# Patient Record
Sex: MALE | Race: WHITE | NOT HISPANIC OR LATINO | Employment: OTHER | URBAN - METROPOLITAN AREA
[De-identification: names, ages, dates, MRNs, and addresses within clinical notes are randomized per-mention and may not be internally consistent; named-entity substitution may affect disease eponyms.]

---

## 2017-02-03 ENCOUNTER — TELEPHONE (OUTPATIENT)
Dept: NEUROSURGERY | Facility: CLINIC | Age: 72
End: 2017-02-03

## 2017-02-03 DIAGNOSIS — M51.36 DDD (DEGENERATIVE DISC DISEASE), LUMBAR: Primary | ICD-10-CM

## 2017-02-03 NOTE — TELEPHONE ENCOUNTER
Provider:  Darrell   Caller:Emilie     Phone #:  [phone]  Surgery:  LUMBAR LAMINECTOMY POSTERIOR LUMBAR INTERBODY FUSION L4-S1  Surgery Date:  10/28/16  Last visit:   12/5/16    KOLBY:         Reason for call:       Wife left message stating pt needs an extension on the PT order, provided fax number of therapist office. Fax#[phone]

## 2017-10-05 ENCOUNTER — OFFICE VISIT (OUTPATIENT)
Dept: NEUROSURGERY | Facility: CLINIC | Age: 72
End: 2017-10-05

## 2017-10-05 VITALS
DIASTOLIC BLOOD PRESSURE: 76 MMHG | BODY MASS INDEX: 22.96 KG/M2 | HEIGHT: 71 IN | SYSTOLIC BLOOD PRESSURE: 120 MMHG | TEMPERATURE: 97.2 F | WEIGHT: 164 LBS

## 2017-10-05 DIAGNOSIS — G89.29 CHRONIC MIDLINE LOW BACK PAIN WITHOUT SCIATICA: Primary | ICD-10-CM

## 2017-10-05 DIAGNOSIS — M54.50 CHRONIC MIDLINE LOW BACK PAIN WITHOUT SCIATICA: Primary | ICD-10-CM

## 2017-10-05 PROCEDURE — 99213 OFFICE O/P EST LOW 20 MIN: CPT | Performed by: PHYSICIAN ASSISTANT

## 2017-10-05 RX ORDER — MELOXICAM 15 MG/1
15 TABLET ORAL DAILY
Qty: 90 TABLET | Refills: 3 | Status: SHIPPED | OUTPATIENT
Start: 2017-10-05 | End: 2022-05-24

## 2017-10-05 RX ORDER — RANITIDINE 150 MG/1
150 TABLET ORAL DAILY
COMMUNITY
End: 2022-05-24

## 2017-10-05 RX ORDER — MELOXICAM 15 MG/1
15 TABLET ORAL DAILY
Qty: 90 TABLET | Refills: 3 | Status: SHIPPED | OUTPATIENT
Start: 2017-10-05 | End: 2017-10-05

## 2017-10-05 NOTE — PROGRESS NOTES
Patient: Prashant Parra  : 1945    Primary Care Provider: Miguel Clarke MD      History    Chief Complaint: Chronic low back pain    History of Present Illness: Patient is well-known to the neurosurgical practice for undergoing a L4 to S1 posterior lateral interbody fusion on 2016.  Patient is very active male for 71 years old.  Patient has had ongoing back pain that is limited his activities.  Patient states that he does fine when sitting, but going from a seated to standing position has been causing back pain.  Patient denies any hip or leg pain that would be associated with this.  Patient states it is worse in the morning when he first wakes up and once he gets moving, it alleviates.  This is a very telling picture of arthritic back pain.  Patient has not been taking any anti-inflammatories.    135 minute discussion with patient and wife about expectations following his surgery.  This magnitude.  Patient does state that he is much better with his back pain than he was prior to surgery.  Patient actually states that he has no leg pain as compared to prior to surgery.  Patient initially presented with exquisite back pain and radiating sciatica pain and bilateral lower extremities when walking.    Patient is about to go to [place] again for the year.  Patient is leaving on  for the winter.  I'll like to try the patient on anti-inflammatory to hopefully help with this arthritic back pain.  If this does not help, we may pursue x-rays of the lumbar spine as well as of pain management injection.       Review of Systems    Constitutional: Negative for activity change, appetite change, chills, diaphoresis, fatigue, fever and unexpected weight change.   HENT: Positive for rhinorrhea. Negative for congestion, dental problem, drooling, ear discharge, ear pain, facial swelling, hearing loss, mouth sores, nosebleeds, postnasal drip, sinus pressure, sneezing, sore throat, tinnitus, trouble  swallowing and voice change.    Eyes: Negative for photophobia, pain, discharge, redness, itching and visual disturbance.   Respiratory: Negative for apnea, cough, choking, chest tightness, shortness of breath, wheezing and stridor.    Cardiovascular: Negative for chest pain, palpitations and leg swelling.   Gastrointestinal: Negative for abdominal distention, abdominal pain, anal bleeding, blood in stool, constipation, diarrhea, nausea, rectal pain and vomiting.   Endocrine: Negative for cold intolerance, heat intolerance, polydipsia, polyphagia and polyuria.   Genitourinary: Positive for urgency. Negative for decreased urine volume, difficulty urinating, dysuria, enuresis, flank pain, frequency, genital sores and hematuria.   Musculoskeletal: Positive for back pain and gait problem. Negative for arthralgias, joint swelling, myalgias, neck pain and neck stiffness.   Skin: Negative for color change, pallor, rash and wound.   Allergic/Immunologic: Negative for environmental allergies, food allergies and immunocompromised state.   Neurological: Negative for dizziness, tremors, seizures, syncope, facial asymmetry, speech difficulty, weakness, light-headedness, numbness and headaches.   Hematological: Negative for adenopathy. Bruises/bleeds easily.   Psychiatric/Behavioral: Negative for agitation, behavioral problems, confusion, decreased concentration, dysphoric mood, hallucinations, self-injury, sleep disturbance and suicidal ideas. The patient is nervous/anxious. The patient is not hyperactive.      Past Medical History:     Past Medical History:   Diagnosis Date   • Back pain    • Bleeding ulcer     1972   • BPH (benign prostatic hypertrophy)    • Cervical spondylosis without myelopathy    • Coronary artery disease    • Degeneration of intervertebral disc of cervical region    • Depression    • Diabetes mellitus     DIAGNOSED 1994; CHECKS [facility]SBG DAILY    • History of transfusion     1972 FROM ULCER   • Hyperlipidemia   "  • Hypertension    • Kidney stone    • MI (myocardial infarction)    • Shingles        Family History:     Family History   Problem Relation Age of Onset   • Heart disease Mother    • Heart disease Father    • Diabetes Father        Social History:    reports that he quit smoking about 28 years ago. He has never used smokeless tobacco. He reports that he does not drink alcohol or use illicit drugs.    Surgical History:     Past Surgical History:   Procedure Laterality Date   • CARDIAC CATHETERIZATION     • CORONARY ARTERY BYPASS GRAFT  2004    x 3 vessels   • CYSTOSCOPY W/ LASER LITHOTRIPSY     • INTERVENTIONAL RADIOLOGY PROCEDURE N/A 10/20/2016    Procedure: IR myelogram lumbar spine;  Surgeon: Chris Oakes MD;  Location:  [facility] CATH INVASIVE LOCATION;  Service:    • LUMBAR DISCECTOMY FUSION INSTRUMENTATION N/A 10/28/2016    Procedure: LUMBAR LAMINECTOMY POSTERIOR LUMBAR INTERBODY FUSION L4-S1;  Surgeon: Toni Ramírez MD;  Location:  [facility];  Service:        Allergies:   Review of patient's allergies indicates no known allergies.    Physical Exam:   /76  Temp 97.2 °F (36.2 °C)  Ht 71\" (180.3 cm)  Wt 164 lb (74.4 kg)  BMI 22.87 kg/m2  GENEREAL:   The patient is in no acute distress, and is able to answer all questions appropriately.  HEENT: Pupils are equal and reactive to light.  Visual fields are full.  Extraocular movements are intact without nystagmus.  There is no evidence of trauma.  Neck: Supple without lymphadenopathy  Cardiovascular: Regular rate and rhythm   Abdomen: Soft, non-tender, non-distended.    Musculoskeletal: The patient’s strength is intact in upper and lower extremities upon direct testing.   strength is 5 out of 5 bilaterally. Shoulder abduction is 5 out of 5.  Dorsiflexion and plantarflexion are equal bilaterally as well as the hip flexion against resistance.  The patient’s gait is normal without antalgia.  Neurologic: The patient is alert and oriented by 3.  Recent " memory, language, attention span, and fund of knowledge are all with within normal limits.  There is no evidence of central motor drift. No facial droop.  No difficulty with rapid alternating movements.  Sensation is equal bilaterally with no deficit.    Reflexes are 2+ at biceps, triceps, brachioradialis, as well as the patellar and Achilles tendon bilaterally.  CRANIAL NERVES:  Cranial nerve II: Review of the fundi demonstrates no edema.  Visual fields are full to confrontation.  Cranial nerves III, IV and VI: PERRLA DC.  Extraocular movements are intact.  Nystagmus is not present.  Cranial nerve V: Visual sensation is intact to light touch.  Cranial nerve VII: Muscles of facial expression revealed no asymmetry.  Cranial nerve VIII: Hearing is intact to finger rub bilaterally.  Cranial nerve IX and X: Palate elevates symmetrically.   Cranial nerve XI: Shoulder shrug is intact.  Cranial nerve XII: Tongue is midline without evidence of Atrophy or fasciculation.    There is some edema noted in the left lower extremity but is 1+.    Medical Decision Making    Data Review:   No films reviewed at this visit    Diagnosis:   Chronic low back pain  Arthritic back pain  Postlaminectomy syndrome    Treatment Options:   At this time we're going to treat this conservatively with medicines.  I have prescribed Mobic 15 mg take 1 by mouth daily.  Patient will take this over the winter and if patient is no better, they will call and I'll give further recommendations as far as pain management and x-rays.    It has been a pleasure providing neurosurgical care.    Freddy Becker PA-C      No diagnosis found.

## 2017-10-05 NOTE — PROGRESS NOTES
Subjective   Patient ID: Prashant Parra is a 71 y.o. male is here today for follow-up for ***.    HPI:      ***    The following portions of the patient's history were reviewed and updated as appropriate: allergies, current medications, past family history, past medical history, past social history, past surgical history and problem list.    Review of Systems   Constitutional: Negative for activity change, appetite change, chills, diaphoresis, fatigue, fever and unexpected weight change.   HENT: Positive for rhinorrhea. Negative for congestion, dental problem, drooling, ear discharge, ear pain, facial swelling, hearing loss, mouth sores, nosebleeds, postnasal drip, sinus pressure, sneezing, sore throat, tinnitus, trouble swallowing and voice change.    Eyes: Negative for photophobia, pain, discharge, redness, itching and visual disturbance.   Respiratory: Negative for apnea, cough, choking, chest tightness, shortness of breath, wheezing and stridor.    Cardiovascular: Negative for chest pain, palpitations and leg swelling.   Gastrointestinal: Negative for abdominal distention, abdominal pain, anal bleeding, blood in stool, constipation, diarrhea, nausea, rectal pain and vomiting.   Endocrine: Negative for cold intolerance, heat intolerance, polydipsia, polyphagia and polyuria.   Genitourinary: Positive for urgency. Negative for decreased urine volume, difficulty urinating, dysuria, enuresis, flank pain, frequency, genital sores and hematuria.   Musculoskeletal: Positive for back pain and gait problem. Negative for arthralgias, joint swelling, myalgias, neck pain and neck stiffness.   Skin: Negative for color change, pallor, rash and wound.   Allergic/Immunologic: Negative for environmental allergies, food allergies and immunocompromised state.   Neurological: Negative for dizziness, tremors, seizures, syncope, facial asymmetry, speech difficulty, weakness, light-headedness, numbness and headaches.   Hematological:  Negative for adenopathy. Bruises/bleeds easily.   Psychiatric/Behavioral: Negative for agitation, behavioral problems, confusion, decreased concentration, dysphoric mood, hallucinations, self-injury, sleep disturbance and suicidal ideas. The patient is nervous/anxious. The patient is not hyperactive.          Objective     Physical Exam   ***    Assessment/Plan   Independent Review of Radiographic Studies:    ***  Medical Decision Making:    ***  There are no diagnoses linked to this encounter.  No Follow-up on file.

## 2018-07-02 ENCOUNTER — LAB REQUISITION (OUTPATIENT)
Dept: LAB | Facility: HOSPITAL | Age: 73
End: 2018-07-02

## 2018-07-02 ENCOUNTER — OUTSIDE FACILITY SERVICE (OUTPATIENT)
Dept: GASTROENTEROLOGY | Facility: CLINIC | Age: 73
End: 2018-07-02

## 2018-07-02 DIAGNOSIS — R07.9 CHEST PAIN: ICD-10-CM

## 2018-07-02 DIAGNOSIS — R63.0 ANOREXIA: ICD-10-CM

## 2018-07-02 DIAGNOSIS — K29.00 ACUTE GASTRITIS WITHOUT BLEEDING: ICD-10-CM

## 2018-07-02 PROCEDURE — 88305 TISSUE EXAM BY PATHOLOGIST: CPT | Performed by: INTERNAL MEDICINE

## 2018-07-02 PROCEDURE — 43239 EGD BIOPSY SINGLE/MULTIPLE: CPT | Performed by: INTERNAL MEDICINE

## 2018-07-03 LAB
CYTO UR: NORMAL
LAB AP CASE REPORT: NORMAL
LAB AP CLINICAL INFORMATION: NORMAL
PATH REPORT.FINAL DX SPEC: NORMAL
PATH REPORT.GROSS SPEC: NORMAL

## 2018-07-12 ENCOUNTER — TELEPHONE (OUTPATIENT)
Dept: GASTROENTEROLOGY | Facility: CLINIC | Age: 73
End: 2018-07-12

## 2020-10-08 ENCOUNTER — OFFICE VISIT (OUTPATIENT)
Dept: ENDOCRINOLOGY | Facility: CLINIC | Age: 75
End: 2020-10-08

## 2020-10-08 VITALS
HEIGHT: 71 IN | BODY MASS INDEX: 23.46 KG/M2 | SYSTOLIC BLOOD PRESSURE: 134 MMHG | DIASTOLIC BLOOD PRESSURE: 71 MMHG | WEIGHT: 167.6 LBS

## 2020-10-08 DIAGNOSIS — IMO0002 DIABETES MELLITUS TYPE 2, UNCONTROLLED, WITH COMPLICATIONS: Primary | ICD-10-CM

## 2020-10-08 LAB
EXPIRATION DATE: NORMAL
HBA1C MFR BLD: 8.6 %
Lab: NORMAL

## 2020-10-08 PROCEDURE — 83036 HEMOGLOBIN GLYCOSYLATED A1C: CPT | Performed by: PHYSICIAN ASSISTANT

## 2020-10-08 PROCEDURE — 99213 OFFICE O/P EST LOW 20 MIN: CPT | Performed by: PHYSICIAN ASSISTANT

## 2020-10-08 RX ORDER — CARBIDOPA 25 MG/1
TABLET ORAL EVERY 6 HOURS SCHEDULED
COMMUNITY

## 2020-10-08 RX ORDER — ERGOCALCIFEROL (VITAMIN D2) 50 MCG
CAPSULE ORAL
COMMUNITY

## 2020-10-08 RX ORDER — BACILLUS COAGULANS/INULIN 1B-250 MG
CAPSULE ORAL
COMMUNITY
End: 2022-05-24

## 2020-10-08 RX ORDER — LISINOPRIL 10 MG/1
TABLET ORAL
COMMUNITY

## 2020-10-08 RX ORDER — CANAGLIFLOZIN 100 MG/1
TABLET, FILM COATED ORAL
COMMUNITY
Start: 2020-08-08 | End: 2021-06-08 | Stop reason: SDUPTHER

## 2020-10-08 RX ORDER — METOPROLOL TARTRATE 100 MG/1
TABLET ORAL
COMMUNITY
End: 2020-10-08 | Stop reason: SDUPTHER

## 2020-10-08 RX ORDER — MEMANTINE HYDROCHLORIDE AND DONEPEZIL HYDROCHLORIDE 28; 10 MG/1; MG/1
CAPSULE ORAL
COMMUNITY

## 2020-10-08 RX ORDER — ALPRAZOLAM 0.25 MG/1
0.25 TABLET ORAL NIGHTLY PRN
COMMUNITY
Start: 2020-08-11

## 2020-10-08 RX ORDER — NITROGLYCERIN 0.4 MG/1
TABLET SUBLINGUAL
COMMUNITY

## 2020-10-08 RX ORDER — ATORVASTATIN CALCIUM 40 MG/1
80 TABLET, FILM COATED ORAL DAILY
COMMUNITY

## 2020-10-08 RX ORDER — ASPIRIN 81 MG/1
TABLET ORAL
COMMUNITY
End: 2020-10-08 | Stop reason: SDUPTHER

## 2020-10-08 RX ORDER — DULOXETIN HYDROCHLORIDE 60 MG/1
CAPSULE, DELAYED RELEASE ORAL
COMMUNITY

## 2020-10-08 RX ORDER — CHLORAL HYDRATE 500 MG
1000 CAPSULE ORAL 2 TIMES DAILY WITH MEALS
COMMUNITY

## 2020-10-08 NOTE — PROGRESS NOTES
"     Office Note      Date: 10/08/2020  Patient Name: Prashant Parra  MRN: [id number]  : 1945    Chief Complaint   Patient presents with   • Diabetes       History of Present Illness:   Prashant Parra is a 74 y.o. male who presents for Type 2 diabetes.  He reports he is taking his medication regularly.  He denies any hypoglycemia.  He reports he has been eating pies after dinner most nights and knows he needs to cut this out. He reports he has been more active this summer and feels well today.  He reports he has noted some weakness in his legs and trouble with his balance since he was diagnosed with Parkinson's.    He reports he is going back to [place] for the winter later this month.  He has an appt to get his flu vaccine tomorrow.  He reports he had labs with his PCP in May-- will have these sent here for review.        Subjective     Review of Systems:   Review of Systems   Constitutional: Negative for activity change, appetite change, chills, diaphoresis, fatigue, fever and unexpected weight change.   HENT: Positive for congestion.    Cardiovascular: Negative for chest pain, palpitations and leg swelling.   Gastrointestinal: Negative for abdominal distention, abdominal pain, constipation, diarrhea, nausea and vomiting.   Endocrine: Negative for cold intolerance, heat intolerance, polydipsia, polyphagia and polyuria.       The following portions of the patient's history were reviewed and updated as appropriate: allergies, current medications, past family history, past medical history, past social history, past surgical history and problem list.    Objective     Vitals:    10/08/20 1449   BP: 134/71   BP Location: Left arm   Patient Position: Sitting   Cuff Size: Adult   Weight: 76 kg (167 lb 9.6 oz)   Height: 180.3 cm (71\")   PainSc: 0-No pain     Body mass index is 23.38 kg/m².    Physical Exam  Constitutional:       General: He is not in acute distress.     Appearance: Normal appearance. "   Cardiovascular:      Pulses:           Dorsalis pedis pulses are 1+ on the right side and 1+ on the left side.        Posterior tibial pulses are 1+ on the right side and 1+ on the left side.   Musculoskeletal:      Right foot: No deformity.      Left foot: No deformity.   Feet:      Right foot:      Protective Sensation: 5 sites tested. 4 sites sensed.      Skin integrity: Skin integrity normal.      Toenail Condition: Right toenails are abnormally thick.      Left foot:      Protective Sensation: 5 sites tested.      Skin integrity: Skin integrity normal.      Toenail Condition: Left toenails are abnormally thick.      Comments: Great toenails thickened bilaterally  Diabetic Foot Exam Performed and Monofilament Test Performed  Neurological:      Mental Status: He is alert.             Assessment / Plan      Assessment & Plan:  1. Diabetes mellitus type 2, uncontrolled, with complications (CMS/[facility])  A1c today 8.6%-- slightly improved, but above goal-- discussed insulin and DPP4 inhibitor.  Pt prefers to cut back on his sweets and will check A1c in about 3-4 mos and do telehealth visit for monitoring.  If A1c has not improved will add new medication.  Pt to call prn.    Pt will have copy of recent labs sent here for review.    - POC Glycosylated Hemoglobin (Hb A1C)  - Hemoglobin A1c; Future      Return in about 4 months (around 2/8/2021), or telehealth, for Recheck, Video visit.     Jessica Lucio PA-C  10/08/2020

## 2021-02-18 ENCOUNTER — TELEPHONE (OUTPATIENT)
Dept: ENDOCRINOLOGY | Facility: CLINIC | Age: 76
End: 2021-02-18

## 2021-03-02 ENCOUNTER — TELEPHONE (OUTPATIENT)
Dept: ENDOCRINOLOGY | Facility: CLINIC | Age: 76
End: 2021-03-02

## 2021-03-18 ENCOUNTER — TELEPHONE (OUTPATIENT)
Dept: ENDOCRINOLOGY | Facility: CLINIC | Age: 76
End: 2021-03-18

## 2021-03-18 NOTE — TELEPHONE ENCOUNTER
Called and spoke with patients wife.  States that his blood sugar has been running high.  Readings have been   3/3/   3/4/21 -207   3/5/   3/6/   3/7/   3/8/   3/9/   3/10/   3/11/   3/12/21 at 1230pm-309   3/13/21 at 900am 281   3/18/21 at 900am-200.  Patient is currently taking metformin, glimepiride and Invokana.

## 2021-03-18 NOTE — TELEPHONE ENCOUNTER
Called and spoke with pt and his wife-- they prefer to hold off on insulin until he can come into the office.  We will add Januvia 100mg daily for now and see if this helps.  If his BGs stay in the 200-300s we will need to consider insulin.  They voiced understanding and I sent a prescription.  RT

## 2021-03-18 NOTE — TELEPHONE ENCOUNTER
Patient's wife called concerned for her . Says for several days now his blood sugar has been in the high 200s-300s. They're in [place] until the middle of May and they aren't sure what they should do. They asked for a call back either before 11 today or after three. Please advise.

## 2021-03-22 NOTE — TELEPHONE ENCOUNTER
PT CALLED AND WANTED TO KNOW IF SHE COULD SWITCH THE RX JANUVIA FROM [facility]. PT STATED IT WAS TO EXPENSIVE  AND WANTED US TO SEND IT TO [facility]. IF YOU COULD GIVE PT A CALL THEY ARE IN [place]. THANKS

## 2021-06-08 ENCOUNTER — OFFICE VISIT (OUTPATIENT)
Dept: ENDOCRINOLOGY | Facility: CLINIC | Age: 76
End: 2021-06-08

## 2021-06-08 VITALS
HEART RATE: 74 BPM | SYSTOLIC BLOOD PRESSURE: 128 MMHG | HEIGHT: 71 IN | OXYGEN SATURATION: 93 % | WEIGHT: 172 LBS | BODY MASS INDEX: 24.08 KG/M2 | DIASTOLIC BLOOD PRESSURE: 72 MMHG

## 2021-06-08 DIAGNOSIS — E11.42 TYPE 2 DIABETES MELLITUS WITH DIABETIC POLYNEUROPATHY, WITHOUT LONG-TERM CURRENT USE OF INSULIN (HCC): ICD-10-CM

## 2021-06-08 DIAGNOSIS — E11.65 TYPE 2 DIABETES MELLITUS WITH HYPERGLYCEMIA, WITHOUT LONG-TERM CURRENT USE OF INSULIN (HCC): Primary | ICD-10-CM

## 2021-06-08 PROBLEM — E78.2 MIXED HYPERLIPIDEMIA: Status: ACTIVE | Noted: 2021-06-08

## 2021-06-08 PROBLEM — I10 ESSENTIAL HYPERTENSION: Status: ACTIVE | Noted: 2021-06-08

## 2021-06-08 PROCEDURE — 99213 OFFICE O/P EST LOW 20 MIN: CPT | Performed by: PHYSICIAN ASSISTANT

## 2021-06-08 RX ORDER — GLIMEPIRIDE 4 MG/1
4 TABLET ORAL 2 TIMES DAILY
Qty: 180 TABLET | Refills: 2 | Status: SHIPPED | OUTPATIENT
Start: 2021-06-08 | End: 2022-03-14

## 2021-06-08 RX ORDER — CANAGLIFLOZIN 100 MG/1
100 TABLET, FILM COATED ORAL DAILY
Qty: 90 TABLET | Refills: 2 | Status: SHIPPED | OUTPATIENT
Start: 2021-06-08 | End: 2022-03-07

## 2021-06-08 NOTE — PROGRESS NOTES
"     Office Note      Date: 2021  Patient Name: Prashant Parra  MRN: [id number]  : 1945    Chief Complaint   Patient presents with   • Diabetes       History of Present Illness:   Prashant Parra is a 75 y.o. male who presents for 2 diabetes follow-up.  He is accompanied by his wife today.  In March he was having trouble with elevated blood sugar so we added Januvia 100 mg daily.  He reports he has tolerated this medication with no trouble and has also worked on limiting his sweet intake.  In addition to the Januvia he remains on glimepiride 4 mg 2 tablets daily, Invokana 100 mg daily and Metformin at 1000 mg twice a day.  He had labs done last month with his primary care physician and his A1c had improved to 7.7%.  He is up-to-date on his annual eye exam.  He has had both doses of his Covid vaccine.  He denies any trouble with his feet today and just recently saw his podiatrist.  We did a foot exam in our office back in October.  He recently had fasting labs with his primary care physician.      Subjective     Review of Systems:   Review of Systems   Constitutional: Negative.    Cardiovascular: Negative.    Gastrointestinal: Negative.    Endocrine: Negative.    Neurological: Negative.        The following portions of the patient's history were reviewed and updated as appropriate: allergies, current medications, past family history, past medical history, past social history, past surgical history and problem list.    Objective     Vitals:    21 1015   BP: 128/72   BP Location: Right arm   Patient Position: Sitting   Cuff Size: Adult   Pulse: 74   SpO2: 93%   Weight: 78 kg (172 lb)   Height: 180.3 cm (71\")   PainSc: 0-No pain     Body mass index is 23.99 kg/m².    Physical Exam  Vitals reviewed.   Constitutional:       General: He is not in acute distress.     Appearance: Normal appearance.   Neurological:      Mental Status: He is alert.         HEMOGLOBIN A1C  Lab Results   Component Value " Date    HGBA1C 8.6 10/08/2020         Assessment / Plan      Assessment & Plan:  1. Type 2 diabetes mellitus with hyperglycemia, without long-term current use of insulin (CMS/[facility])  His A1c last month had improved at 7.7%.  This is acceptable for him.  For now we will continue glimepiride 4 mg 2 tablets daily, Invokana 100 mg daily, Metformin at 1000 mg twice a day and Januvia 100 mg daily.  I encouraged continued healthy eating habits and physical activity as tolerated.  I refilled his diabetes medications today.  His blood pressure was okay today.  His weight is up 4 pounds since his appointment in October.  Reviewed his fasting lab results from his primary care physician his metabolic panel was normal his cholesterol numbers were to goal except for low HDL cholesterol and mildly elevated triglycerides.  Thyroid tests were normal.    2. Type 2 diabetes mellitus with diabetic polyneuropathy, without long-term current use of insulin (CMS/HCC)  His A1c has improved at 7.7% and is acceptable.  We did his foot exam at his appointment in October he sees the podiatrist regularly.      Return in about 4 months (around 10/8/2021) for Recheck.     Jessica Lucio PA-C  06/08/2021

## 2021-12-02 ENCOUNTER — TELEPHONE (OUTPATIENT)
Dept: ENDOCRINOLOGY | Facility: CLINIC | Age: 76
End: 2021-12-02

## 2022-01-31 ENCOUNTER — TELEPHONE (OUTPATIENT)
Dept: ENDOCRINOLOGY | Facility: CLINIC | Age: 77
End: 2022-01-31

## 2022-02-17 NOTE — TELEPHONE ENCOUNTER
Due to change in Dr Aguilar schedule on 2/18. Patient time was changed to 130pm with arrival of 12pm  Patient was called and is aware   Spoke with patient and reviewed lab with him letter in chart.  He is going to call back to schedule.

## 2022-03-07 ENCOUNTER — TELEPHONE (OUTPATIENT)
Dept: ENDOCRINOLOGY | Facility: CLINIC | Age: 77
End: 2022-03-07

## 2022-03-07 RX ORDER — DAPAGLIFLOZIN 5 MG/1
5 TABLET, FILM COATED ORAL DAILY
Qty: 90 TABLET | Refills: 0 | Status: SHIPPED | OUTPATIENT
Start: 2022-03-07 | End: 2022-05-24 | Stop reason: SDUPTHER

## 2022-03-07 NOTE — TELEPHONE ENCOUNTER
PT'S WIFE CALLED STATING INVOKANA IS NOT COVERED BY INSURANCE. SHE STATED JARDIANCE AND FARXIGA ARE COVERED. SHE REQUESTED WE SEND IN AN RX IN TO [facility].

## 2022-03-07 NOTE — TELEPHONE ENCOUNTER
Please let them know I sent a prescription for Farxiga 5 mg daily to replace invokana.  He must keep his appt schedule in May for further refills.  Thanks RT

## 2022-03-14 RX ORDER — GLIMEPIRIDE 4 MG/1
TABLET ORAL
Qty: 180 TABLET | Refills: 0 | Status: SHIPPED | OUTPATIENT
Start: 2022-03-14 | End: 2022-05-24 | Stop reason: SDUPTHER

## 2022-04-26 RX ORDER — SITAGLIPTIN 100 MG/1
TABLET, FILM COATED ORAL
Qty: 90 TABLET | Refills: 0 | Status: SHIPPED | OUTPATIENT
Start: 2022-04-26 | End: 2022-05-24

## 2022-05-24 ENCOUNTER — OFFICE VISIT (OUTPATIENT)
Dept: ENDOCRINOLOGY | Facility: CLINIC | Age: 77
End: 2022-05-24

## 2022-05-24 ENCOUNTER — TELEPHONE (OUTPATIENT)
Dept: ENDOCRINOLOGY | Facility: CLINIC | Age: 77
End: 2022-05-24

## 2022-05-24 VITALS
HEIGHT: 70 IN | SYSTOLIC BLOOD PRESSURE: 136 MMHG | HEART RATE: 83 BPM | BODY MASS INDEX: 24.34 KG/M2 | OXYGEN SATURATION: 96 % | DIASTOLIC BLOOD PRESSURE: 78 MMHG | WEIGHT: 170 LBS

## 2022-05-24 DIAGNOSIS — E11.65 TYPE 2 DIABETES MELLITUS WITH HYPERGLYCEMIA, WITHOUT LONG-TERM CURRENT USE OF INSULIN: Primary | ICD-10-CM

## 2022-05-24 LAB
EXPIRATION DATE: ABNORMAL
EXPIRATION DATE: NORMAL
GLUCOSE BLDC GLUCOMTR-MCNC: 131 MG/DL (ref 70–130)
HBA1C MFR BLD: 8.2 %
Lab: ABNORMAL
Lab: NORMAL

## 2022-05-24 PROCEDURE — 83036 HEMOGLOBIN GLYCOSYLATED A1C: CPT | Performed by: PHYSICIAN ASSISTANT

## 2022-05-24 PROCEDURE — 82947 ASSAY GLUCOSE BLOOD QUANT: CPT | Performed by: PHYSICIAN ASSISTANT

## 2022-05-24 PROCEDURE — 99213 OFFICE O/P EST LOW 20 MIN: CPT | Performed by: PHYSICIAN ASSISTANT

## 2022-05-24 PROCEDURE — 3052F HG A1C>EQUAL 8.0%<EQUAL 9.0%: CPT | Performed by: PHYSICIAN ASSISTANT

## 2022-05-24 RX ORDER — DAPAGLIFLOZIN 5 MG/1
5 TABLET, FILM COATED ORAL DAILY
Qty: 90 TABLET | Refills: 1 | Status: SHIPPED | OUTPATIENT
Start: 2022-05-24 | End: 2022-08-30 | Stop reason: SDUPTHER

## 2022-05-24 RX ORDER — SEMAGLUTIDE 1.34 MG/ML
0.5 INJECTION, SOLUTION SUBCUTANEOUS WEEKLY
Qty: 4.5 ML | Refills: 1 | Status: SHIPPED | OUTPATIENT
Start: 2022-05-24 | End: 2022-05-26 | Stop reason: SDUPTHER

## 2022-05-24 RX ORDER — GLIMEPIRIDE 4 MG/1
4 TABLET ORAL 2 TIMES DAILY
Qty: 180 TABLET | Refills: 1 | Status: SHIPPED | OUTPATIENT
Start: 2022-05-24 | End: 2022-08-30 | Stop reason: SDUPTHER

## 2022-05-24 NOTE — PROGRESS NOTES
"     Office Note      Date: 2022  Patient Name: Prashant Parra  MRN: [id number]  : 1945    Chief Complaint   Patient presents with   • Diabetes       History of Present Illness:   Prashant Parra is a 76 y.o. male who presents for follow-up for type 2 diabetes.  It is been almost a year since his last appointment.  They have been in [place] for the winter and stay longer than expected.  He remains on Januvia 100 mg daily, glimepiride 4 mg 2 tablets daily, Farxiga 5 mg daily and metformin 1000 mg twice a day.  He reports he eats some sweets but overall tries to do a good job with his diet.  He reports he is due for his eye exam and has an appointment in October.  He reports his primary care physician just check fasting labs on him earlier this month.  His wife forgot to bring the copy today.  He denies any trouble with his feet today he sees the podiatrist regularly.      Subjective     Review of Systems:   Review of Systems   Constitutional: Negative.    Cardiovascular: Negative.    Gastrointestinal: Negative.    Endocrine: Negative.    Neurological: Negative.        The following portions of the patient's history were reviewed and updated as appropriate: allergies, current medications, past family history, past medical history, past social history, past surgical history and problem list.    Objective     Vitals:    22 1038   BP: 136/78   Pulse: 83   SpO2: 96%   Weight: 77.1 kg (170 lb)   Height: 177.8 cm (70\")   PainSc: 0-No pain     Body mass index is 24.39 kg/m².    Physical Exam  Vitals reviewed.   Constitutional:       General: He is not in acute distress.     Appearance: Normal appearance.   Neurological:      Mental Status: He is alert.         HEMOGLOBIN A1C  Lab Results   Component Value Date    HGBA1C 8.2 2022       GLUCOSE  Glucose   Date Value Ref Range Status   2022 131 (A) 70 - 130 mg/dL Final           Assessment / Plan      Assessment & Plan:  1. Type 2 diabetes " mellitus with hyperglycemia, without long-term current use of insulin (HCC)  His A1c is up some today at 8.2%.  He reports he is willing to consider a once a week injectable to try to get his blood glucose readings under better control.  We will stop the Januvia and add Ozempic 0.5 mg weekly.  He was instructed to start with a 0.25 mg weekly dose for the first 4 weeks and then increase to the 0.5 mg dose.  I reviewed how to use the pen device and sent a prescription for this to his pharmacy.  We discussed the possible side effects as well as the cardiovascular benefits.  For now he will continue Farxiga 5 mg daily, metformin 1000 mg twice a day and glimepiride 4 mg 2 tablets daily.  We discussed monitoring his blood glucose readings and if he has symptoms of hypoglycemia we may need to consider reducing the glimepiride.  He will watch for this.  I encouraged him to contact me if the medication is too expensive or he has trouble tolerating this.  His blood pressure is okay today.  His weight is down 2 pounds since his appointment in June.  I encouraged healthy eating habits and physical activity as tolerated.  We will try to get a copy of his lab results from his primary care physician.  - POC Glycosylated Hemoglobin (Hb A1C)  - POC Glucose, Blood      Return in about 3 months (around 8/24/2022) for Recheck.     This note was dictated using Dragon voice recognition.    Jessica Lucio PA-C  05/24/2022

## 2022-05-24 NOTE — PATIENT INSTRUCTIONS
Stop the Januvia  Start Ozempic 0.25mg once a week for 4 weeks then go to the 0.5mg dose weekly until your appointment

## 2022-05-26 ENCOUNTER — TELEPHONE (OUTPATIENT)
Dept: ENDOCRINOLOGY | Facility: CLINIC | Age: 77
End: 2022-05-26

## 2022-05-26 RX ORDER — DULAGLUTIDE 0.75 MG/.5ML
0.75 INJECTION, SOLUTION SUBCUTANEOUS WEEKLY
Qty: 6 ML | Refills: 1 | Status: SHIPPED | OUTPATIENT
Start: 2022-05-26 | End: 2022-08-30 | Stop reason: SDUPTHER

## 2022-05-26 NOTE — TELEPHONE ENCOUNTER
Let's switch him to Trulicity 0.75mg weekly and keep him on this dose.  Please let the patient know the Ozempic is not covered and we are switching to Trulicity once a week.  Do I need to send in a prescription or do we have to call it in with the reference number?  Let me know, thanks RT

## 2022-05-26 NOTE — TELEPHONE ENCOUNTER
"ref: [id number]    Elsa from [facility] calling on behalf of patient regarding semaglutide.    She is calling because the patient's plan does not cover the ozempic. [facility] is calling to see if we would be willing to switch to one of the \"preferred covered alternatives\" such as:  1. byetta pre filled pen in 5 or 10 mcg mfg by [employer]  2. Bydureon bcise auto injector in 2mg mfg by [employer]  3. trulicity sd pen in 0.75mg and 1.5mg and 3mg and 4.5mg mfg by [facility]      "

## 2022-06-03 ENCOUNTER — TELEPHONE (OUTPATIENT)
Dept: ENDOCRINOLOGY | Facility: CLINIC | Age: 77
End: 2022-06-03

## 2022-06-09 DIAGNOSIS — Z12.11 ENCOUNTER FOR SCREENING COLONOSCOPY: Primary | ICD-10-CM

## 2022-06-16 ENCOUNTER — TELEPHONE (OUTPATIENT)
Dept: GASTROENTEROLOGY | Facility: CLINIC | Age: 77
End: 2022-06-16

## 2022-06-20 ENCOUNTER — TELEPHONE (OUTPATIENT)
Dept: ENDOCRINOLOGY | Facility: CLINIC | Age: 77
End: 2022-06-20

## 2022-06-20 NOTE — TELEPHONE ENCOUNTER
Spoke to pts wife - does he take farxiga along with trulicity?  Yes - along with metformin and glimepiride.  She voiced understanding

## 2022-06-24 ENCOUNTER — TELEPHONE (OUTPATIENT)
Dept: GASTROENTEROLOGY | Facility: CLINIC | Age: 77
End: 2022-06-24

## 2022-06-24 NOTE — TELEPHONE ENCOUNTER
Deana,  I  Called Mrs Parra back. Patients' wife is wondering to know if an EGD can be added on the same day as Colonoscopy for 6/30/2022 with Dr Montemayor. Patient has history of ulcers. Can you please give wife a call back.

## 2022-06-30 ENCOUNTER — OUTSIDE FACILITY SERVICE (OUTPATIENT)
Dept: GASTROENTEROLOGY | Facility: CLINIC | Age: 77
End: 2022-06-30

## 2022-06-30 PROCEDURE — G0121 COLON CA SCRN NOT HI RSK IND: HCPCS | Performed by: INTERNAL MEDICINE

## 2022-06-30 PROCEDURE — 88305 TISSUE EXAM BY PATHOLOGIST: CPT | Performed by: INTERNAL MEDICINE

## 2022-06-30 PROCEDURE — 43239 EGD BIOPSY SINGLE/MULTIPLE: CPT | Performed by: INTERNAL MEDICINE

## 2022-06-30 RX ORDER — PANTOPRAZOLE SODIUM 40 MG/1
TABLET, DELAYED RELEASE ORAL
Qty: 90 TABLET | Refills: 3 | Status: SHIPPED | OUTPATIENT
Start: 2022-06-30

## 2022-07-01 ENCOUNTER — LAB REQUISITION (OUTPATIENT)
Dept: LAB | Facility: HOSPITAL | Age: 77
End: 2022-07-01

## 2022-07-01 ENCOUNTER — TELEPHONE (OUTPATIENT)
Dept: GASTROENTEROLOGY | Facility: CLINIC | Age: 77
End: 2022-07-01

## 2022-07-01 DIAGNOSIS — R13.10 DYSPHAGIA, UNSPECIFIED: ICD-10-CM

## 2022-07-01 DIAGNOSIS — Z12.11 ENCOUNTER FOR SCREENING FOR MALIGNANT NEOPLASM OF COLON: ICD-10-CM

## 2022-07-01 DIAGNOSIS — K31.89 OTHER DISEASES OF STOMACH AND DUODENUM: ICD-10-CM

## 2022-07-01 DIAGNOSIS — Q39.9 CONGENITAL MALFORMATION OF ESOPHAGUS, UNSPECIFIED: ICD-10-CM

## 2022-07-01 DIAGNOSIS — K44.9 DIAPHRAGMATIC HERNIA WITHOUT OBSTRUCTION OR GANGRENE: ICD-10-CM

## 2022-07-01 DIAGNOSIS — K20.90 ESOPHAGITIS, UNSPECIFIED WITHOUT BLEEDING: ICD-10-CM

## 2022-07-01 DIAGNOSIS — K26.9 DUODENAL ULCER, UNSPECIFIED AS ACUTE OR CHRONIC, WITHOUT HEMORRHAGE OR PERFORATION: ICD-10-CM

## 2022-07-01 DIAGNOSIS — R12 HEARTBURN: ICD-10-CM

## 2022-07-01 NOTE — TELEPHONE ENCOUNTER
I called Mrs Parra back. Informed her that pantoprazole has been sent to [facility] mail order.

## 2022-08-30 ENCOUNTER — OFFICE VISIT (OUTPATIENT)
Dept: ENDOCRINOLOGY | Facility: CLINIC | Age: 77
End: 2022-08-30

## 2022-08-30 VITALS
HEART RATE: 76 BPM | WEIGHT: 163 LBS | SYSTOLIC BLOOD PRESSURE: 126 MMHG | HEIGHT: 70 IN | BODY MASS INDEX: 23.34 KG/M2 | DIASTOLIC BLOOD PRESSURE: 71 MMHG | OXYGEN SATURATION: 96 %

## 2022-08-30 DIAGNOSIS — I10 ESSENTIAL HYPERTENSION: ICD-10-CM

## 2022-08-30 DIAGNOSIS — E11.65 TYPE 2 DIABETES MELLITUS WITH HYPERGLYCEMIA, WITHOUT LONG-TERM CURRENT USE OF INSULIN: Primary | ICD-10-CM

## 2022-08-30 LAB
EXPIRATION DATE: ABNORMAL
EXPIRATION DATE: NORMAL
GLUCOSE BLDC GLUCOMTR-MCNC: 172 MG/DL (ref 70–130)
HBA1C MFR BLD: 7.4 %
Lab: ABNORMAL
Lab: NORMAL

## 2022-08-30 PROCEDURE — 3051F HG A1C>EQUAL 7.0%<8.0%: CPT | Performed by: PHYSICIAN ASSISTANT

## 2022-08-30 PROCEDURE — 83036 HEMOGLOBIN GLYCOSYLATED A1C: CPT | Performed by: PHYSICIAN ASSISTANT

## 2022-08-30 PROCEDURE — 82947 ASSAY GLUCOSE BLOOD QUANT: CPT | Performed by: PHYSICIAN ASSISTANT

## 2022-08-30 PROCEDURE — 99214 OFFICE O/P EST MOD 30 MIN: CPT | Performed by: PHYSICIAN ASSISTANT

## 2022-08-30 RX ORDER — DAPAGLIFLOZIN 5 MG/1
5 TABLET, FILM COATED ORAL DAILY
Qty: 90 TABLET | Refills: 2 | Status: SHIPPED | OUTPATIENT
Start: 2022-08-30

## 2022-08-30 RX ORDER — DULAGLUTIDE 0.75 MG/.5ML
0.75 INJECTION, SOLUTION SUBCUTANEOUS WEEKLY
Qty: 6 ML | Refills: 2 | Status: SHIPPED | OUTPATIENT
Start: 2022-08-30

## 2022-08-30 RX ORDER — GLIMEPIRIDE 4 MG/1
4 TABLET ORAL 2 TIMES DAILY
Qty: 180 TABLET | Refills: 2 | Status: SHIPPED | OUTPATIENT
Start: 2022-08-30 | End: 2023-03-30 | Stop reason: SDUPTHER

## 2022-08-30 NOTE — PROGRESS NOTES
"     Office Note      Date: 2022  Patient Name: Prashant Parra  MRN: [id number]  : 1945    Chief Complaint   Patient presents with   • Diabetes       History of Present Illness:   Prashant Parra is a 76 y.o. male who presents for follow-up for type 2 diabetes.  He is accompanied by his wife today.  At last appointment we added Ozempic to his medications but Trulicity was preferred.  He is taking Trulicity 0.75 mg weekly and reports he has tolerated this with no issues.  He remains on glimepiride 4 mg 2 tablets daily, Farxiga 5 mg daily and metformin 1000 mg twice a day.  He is checking his blood sugar and these are typically around 150 mg/dL.  He has had a few readings above 200 but not on a regular basis.  Denies any severe hypoglycemia.  He is up-to-date on his eye exam he goes annually in October.  He had labs completed with his primary care physician in May.  He sees the podiatrist regularly.  He denies any trouble with his feet today.      Subjective     Review of Systems:   Review of Systems   Constitutional: Negative.    Cardiovascular: Negative.    Gastrointestinal: Negative.    Endocrine: Negative.    Neurological: Negative.        The following portions of the patient's history were reviewed and updated as appropriate: allergies, current medications, past family history, past medical history, past social history, past surgical history and problem list.    Objective     Vitals:    22 1017   BP: 126/71   Pulse: 76   SpO2: 96%   Weight: 73.9 kg (163 lb)   Height: 177.8 cm (70\")     Body mass index is 23.39 kg/m².    Physical Exam  Vitals reviewed.   Constitutional:       General: He is not in acute distress.     Appearance: Normal appearance.   Neurological:      Mental Status: He is alert.         HEMOGLOBIN A1C  Lab Results   Component Value Date    HGBA1C 7.4 2022       GLUCOSE  Glucose   Date Value Ref Range Status   2022 172 (A) 70 - 130 mg/dL Final         Assessment / " Plan      Assessment & Plan:  1. Type 2 diabetes mellitus with hyperglycemia, without long-term current use of insulin (HCC)  His hemoglobin A1c has improved at 7.4%.  We will continue Trulicity 0.75 mg weekly, glimepiride 4 mg 2 tablets daily, Farxiga 5 mg daily and metformin 1000 mg twice a day.  I encouraged continued healthy eating habits and physical activity as tolerated.  I refilled his medications today.  We discussed increasing Trulicity to 1.5 mg weekly if he starts to see more blood glucose readings in the 200s.  He leaves for [place] in December and will be back in May.  He reports he will contact me if his blood glucose readings become elevated.  - POC Glucose, Blood  - POC Glycosylated Hemoglobin (Hb A1C)    2. Essential hypertension  His blood pressure is okay today.  He will continue his current medications.      Return in about 3 months (around 11/30/2022) for Recheck 3-4 mos.     This note was dictated using Dragon voice recognition.    Jessica Lucio PA-C  08/30/2022

## 2023-03-27 ENCOUNTER — TELEPHONE (OUTPATIENT)
Dept: ENDOCRINOLOGY | Facility: CLINIC | Age: 78
End: 2023-03-27
Payer: MEDICARE

## 2023-03-27 NOTE — TELEPHONE ENCOUNTER
Was he discharged on insulin?  Can we get copies of labs and d/c summary from the hospital in [place] so I can review these.  I would suspect his blood glucose readings will improve back on the Trulicity, but they need to give it more time.  If not we may need to add long acting insulin if he is not on anything.  I would like to review the notes first.  He also needs to make an appointment for when they plan to come back from [place].  Thanks RT

## 2023-03-27 NOTE — TELEPHONE ENCOUNTER
Okay, et's try to get the records from the hospital so I can review labs before we make adjustments.  Thanks RT

## 2023-03-27 NOTE — TELEPHONE ENCOUNTER
Blood sugar numbers:  3/20 1115am 281 has eaten b'fast earlier  3/21 0930 207 pre-b'fast  3/22 0800 187 pre-b'fast  3/23 0900 195 pre-b'fast, 4pm 256  3/25 0945 229 pre-b'fast, 530pm 201  3/26 0930 240 pre-b'fast  3/27 0915 221 pre-b'fast.  He was admitted to hosp. In [place] on 2/12/23 with a stroke, they stooped all oral meds and Trulicity and put him on insulin.  He was sent on with a rx. For Metfromin 1000mg BID, Jardiance 10mg QD(not taking d/t he is taking Farxiga 5mg QD. They did not start him back on Trulicty but he restarted it himself this past Friday was his 1st dose since the stroke.  Wife concered blood sugars are so elevated all the time. He is not taking the glimepiride since d/c from hosp. Either. Please advise. CORNELIO

## 2023-03-27 NOTE — TELEPHONE ENCOUNTER
Patient's wife called because she is concerned about her 's blood sugar numbers.  While they were in [facility] he had a stroke and they changed a bunch of his medications, which she has been monitoring since they came back.  Now it seems that his blood sugar has been running high at least over 200 and she has been trying all she knows but she cant get them back down.  She is asking for a call to be advised about what to do for him, whether bringing him in or changing some dosages of his medications.  Please advise.

## 2023-03-27 NOTE — TELEPHONE ENCOUNTER
FILIBERTO wife and scheduled an appt. For Thursday. She will call if he is unable to make it. CORNELIO

## 2023-03-30 ENCOUNTER — OFFICE VISIT (OUTPATIENT)
Dept: ENDOCRINOLOGY | Facility: CLINIC | Age: 78
End: 2023-03-30
Payer: MEDICARE

## 2023-03-30 VITALS
HEIGHT: 70 IN | HEART RATE: 52 BPM | WEIGHT: 150.4 LBS | SYSTOLIC BLOOD PRESSURE: 134 MMHG | OXYGEN SATURATION: 97 % | DIASTOLIC BLOOD PRESSURE: 78 MMHG | BODY MASS INDEX: 21.53 KG/M2

## 2023-03-30 DIAGNOSIS — I10 ESSENTIAL HYPERTENSION: ICD-10-CM

## 2023-03-30 DIAGNOSIS — Z86.73 HISTORY OF CVA (CEREBROVASCULAR ACCIDENT): ICD-10-CM

## 2023-03-30 DIAGNOSIS — E11.65 TYPE 2 DIABETES MELLITUS WITH HYPERGLYCEMIA, WITHOUT LONG-TERM CURRENT USE OF INSULIN: Primary | ICD-10-CM

## 2023-03-30 LAB
EXPIRATION DATE: ABNORMAL
EXPIRATION DATE: NORMAL
GLUCOSE BLDC GLUCOMTR-MCNC: 363 MG/DL (ref 70–130)
HBA1C MFR BLD: 8.7 %
Lab: ABNORMAL
Lab: NORMAL

## 2023-03-30 PROCEDURE — 3052F HG A1C>EQUAL 8.0%<EQUAL 9.0%: CPT | Performed by: PHYSICIAN ASSISTANT

## 2023-03-30 PROCEDURE — 83036 HEMOGLOBIN GLYCOSYLATED A1C: CPT | Performed by: PHYSICIAN ASSISTANT

## 2023-03-30 PROCEDURE — 36415 COLL VENOUS BLD VENIPUNCTURE: CPT | Performed by: PHYSICIAN ASSISTANT

## 2023-03-30 PROCEDURE — 1159F MED LIST DOCD IN RCRD: CPT | Performed by: PHYSICIAN ASSISTANT

## 2023-03-30 PROCEDURE — 3075F SYST BP GE 130 - 139MM HG: CPT | Performed by: PHYSICIAN ASSISTANT

## 2023-03-30 PROCEDURE — 82947 ASSAY GLUCOSE BLOOD QUANT: CPT | Performed by: PHYSICIAN ASSISTANT

## 2023-03-30 PROCEDURE — 99214 OFFICE O/P EST MOD 30 MIN: CPT | Performed by: PHYSICIAN ASSISTANT

## 2023-03-30 PROCEDURE — 1160F RVW MEDS BY RX/DR IN RCRD: CPT | Performed by: PHYSICIAN ASSISTANT

## 2023-03-30 PROCEDURE — 3078F DIAST BP <80 MM HG: CPT | Performed by: PHYSICIAN ASSISTANT

## 2023-03-30 PROCEDURE — 80053 COMPREHEN METABOLIC PANEL: CPT | Performed by: PHYSICIAN ASSISTANT

## 2023-03-30 RX ORDER — GLIMEPIRIDE 4 MG/1
4 TABLET ORAL
Qty: 30 TABLET | Refills: 0 | Status: SHIPPED | OUTPATIENT
Start: 2023-03-30

## 2023-03-30 NOTE — PATIENT INSTRUCTIONS
Continue Trulicity 0.75mg weekly, farxiga 5mg daily (until finished then switch to Jardiance daily), Metformin 1000mg 2x daily  Add Glimepiride 4mg every morning-- if glucose still >200 after 1 week increase to two tablets daily

## 2023-03-30 NOTE — PROGRESS NOTES
Office Note      Date: 2023  Patient Name: Prashant Parra  MRN: [id number]  : 1945    Chief Complaint   Patient presents with   • Diabetes       History of Present Illness:   Prashant Parra is a 77 y.o. male who presents for follow-up for type 2 diabetes.  He had a stroke in February and was hospitalized in [place] from  through .  He was then sent to rehab and checked out  and came back to [place] .  Patient's wife reports he was in the emergency department and hospitalized in December with a TIA from  through .  She reports they were in [place] and getting ready to go to bed and Mr. Parra was unable to speak so she called 911 and they transported him to the local hospital.  He continues to note weakness and is in a wheelchair today but they are having home physical therapy twice a week and is speaking is much better.  Patient's wife reports his blood sugars have been high typically in the 200s to 300s.  She reports he has been taking metformin 1000 mg twice a day, Farxiga 5 mg daily he has 6 weeks left of this and then will be switching to the Jardiance 10 mg which is covered by insurance and Trulicity 0.75 mg weekly.  They just resumed the Trulicity last Friday.  They have not been taking the glimepiride as he has been out of this medication.  Patient's wife reports he was down to 144 pounds but has gained some weight since they have been back to [place].  She reports his appetite has been much better.  She reports he was given an insulin sliding scale in the hospital they did not give him any of his home medications.  Following the stroke he was having a lot of issues with nausea and vomiting and vertigo but this seems to have resolved.  He continues to note significant fatigue and his wife reports he wants to sleep a lot.      Subjective     Review of Systems:   Review of Systems   Constitutional: Positive for fatigue.  "  Cardiovascular: Negative.    Gastrointestinal: Negative.    Endocrine: Negative.    Musculoskeletal: Positive for gait problem.   Neurological: Positive for dizziness and weakness.       The following portions of the patient's history were reviewed and updated as appropriate: allergies, current medications, past family history, past medical history, past social history, past surgical history and problem list.    Objective     Vitals:    03/30/23 1257   BP: 134/78   Pulse: 52   SpO2: 97%   Weight: 68.2 kg (150 lb 6.4 oz)   Height: 177.8 cm (70\")     Body mass index is 21.58 kg/m².    Physical Exam  Vitals reviewed.   Constitutional:       General: He is not in acute distress.     Appearance: Normal appearance.      Comments: In a wheelchair today.   Neurological:      Mental Status: He is alert.         HEMOGLOBIN A1C  Lab Results   Component Value Date    HGBA1C 8.7 03/30/2023       GLUCOSE  Glucose   Date Value Ref Range Status   03/30/2023 363 (A) 70 - 130 mg/dL Final           Assessment / Plan      Assessment & Plan:  1. Type 2 diabetes mellitus with hyperglycemia, without long-term current use of insulin ([facility])  His hemoglobin A1c is up as compared to his appointment in August at 8.7%.  His glucose this afternoon is 363 mg/dL.  I think it is safe to add back glimepiride 4 mg 1 tablet daily.  We discussed that we want to take this slow due to the risk of hypoglycemia.  He will take 1 tablet regularly for 1 week and if his blood glucose readings remain above 250 mg/dL he will increase the dose to 2 tablets daily.  He will continue Trulicity 0.75 mg weekly, Farxiga 5 mg daily and metformin 1000 mg twice a day.  I am checking a CMP today and will reach out to them with the results.    I reviewed recent labs from his hospitalization on March 2 and his GFR was 50.7  His weight is down 13 pounds since his appointment in August.  - POC Glucose, Blood  - POC Glycosylated Hemoglobin (Hb A1C)  - Comprehensive Metabolic " Panel; Future  - Comprehensive Metabolic Panel    2. Essential hypertension  His blood pressure is okay today.  He will continue his current medications.  They are planning to see the cardiologist coming up.    3. History of CVA (cerebrovascular accident)      Return in about 3 months (around 6/30/2023).     This note was dictated using Dragon voice recognition.    Jessica Lucio PA-C  03/30/2023

## 2023-03-31 ENCOUNTER — TELEPHONE (OUTPATIENT)
Dept: ENDOCRINOLOGY | Facility: CLINIC | Age: 78
End: 2023-03-31
Payer: MEDICARE

## 2023-03-31 LAB
ALBUMIN SERPL-MCNC: 3.6 G/DL (ref 3.5–5.2)
ALBUMIN/GLOB SERPL: 1.2 G/DL
ALP SERPL-CCNC: 64 U/L (ref 39–117)
ALT SERPL W P-5'-P-CCNC: <5 U/L (ref 1–41)
ANION GAP SERPL CALCULATED.3IONS-SCNC: 10.1 MMOL/L (ref 5–15)
AST SERPL-CCNC: 11 U/L (ref 1–40)
BILIRUB SERPL-MCNC: 0.3 MG/DL (ref 0–1.2)
BUN SERPL-MCNC: 26 MG/DL (ref 8–23)
BUN/CREAT SERPL: 17.1 (ref 7–25)
CALCIUM SPEC-SCNC: 10 MG/DL (ref 8.6–10.5)
CHLORIDE SERPL-SCNC: 100 MMOL/L (ref 98–107)
CO2 SERPL-SCNC: 26.9 MMOL/L (ref 22–29)
CREAT SERPL-MCNC: 1.52 MG/DL (ref 0.76–1.27)
EGFRCR SERPLBLD CKD-EPI 2021: 46.3 ML/MIN/1.73
GLOBULIN UR ELPH-MCNC: 3 GM/DL
GLUCOSE SERPL-MCNC: 328 MG/DL (ref 65–99)
POTASSIUM SERPL-SCNC: 4.9 MMOL/L (ref 3.5–5.2)
PROT SERPL-MCNC: 6.6 G/DL (ref 6–8.5)
SODIUM SERPL-SCNC: 137 MMOL/L (ref 136–145)

## 2023-03-31 NOTE — TELEPHONE ENCOUNTER
Please call and let them know his liver labs and electrolytes are normal and his kidney function is overall pretty stable.  We will continue with our plan we discussed yesterday.  Start Glimepiride 4 mg one tablet daily and if glucose readings are remaining above 250 mg/dl after 1 week they will add a second tablet.  Thanks RT

## 2023-07-28 ENCOUNTER — HOSPITAL ENCOUNTER (EMERGENCY)
Facility: HOSPITAL | Age: 78
Discharge: HOME | End: 2023-07-28
Attending: EMERGENCY MEDICINE
Payer: MEDICARE

## 2023-07-28 ENCOUNTER — APPOINTMENT (OUTPATIENT)
Dept: GENERAL RADIOLOGY | Facility: HOSPITAL | Age: 78
End: 2023-07-28
Payer: MEDICARE

## 2023-07-28 VITALS
BODY MASS INDEX: 21.47 KG/M2 | SYSTOLIC BLOOD PRESSURE: 146 MMHG | HEIGHT: 70 IN | TEMPERATURE: 98 F | OXYGEN SATURATION: 97 % | HEART RATE: 82 BPM | WEIGHT: 150 LBS | DIASTOLIC BLOOD PRESSURE: 87 MMHG | RESPIRATION RATE: 18 BRPM

## 2023-07-28 DIAGNOSIS — I73.9 PAD (PERIPHERAL ARTERY DISEASE): ICD-10-CM

## 2023-07-28 DIAGNOSIS — M25.561 CHRONIC PAIN OF RIGHT KNEE: Primary | ICD-10-CM

## 2023-07-28 DIAGNOSIS — G89.29 CHRONIC PAIN OF RIGHT KNEE: Primary | ICD-10-CM

## 2023-07-28 DIAGNOSIS — M25.551 CHRONIC RIGHT HIP PAIN: ICD-10-CM

## 2023-07-28 DIAGNOSIS — G89.29 CHRONIC RIGHT HIP PAIN: ICD-10-CM

## 2023-07-28 DIAGNOSIS — M48.062 SPINAL STENOSIS, LUMBAR REGION, WITH NEUROGENIC CLAUDICATION: ICD-10-CM

## 2023-07-28 LAB
ALBUMIN SERPL-MCNC: 4.4 G/DL (ref 3.5–5.2)
ALBUMIN/GLOB SERPL: 1.5 G/DL
ALP SERPL-CCNC: 93 U/L (ref 39–117)
ALT SERPL W P-5'-P-CCNC: 7 U/L (ref 1–41)
ANION GAP SERPL CALCULATED.3IONS-SCNC: 13 MMOL/L (ref 5–15)
APTT PPP: 27.3 SECONDS (ref 60–90)
AST SERPL-CCNC: 14 U/L (ref 1–40)
BASOPHILS # BLD AUTO: 0.05 10*3/MM3 (ref 0–0.2)
BASOPHILS NFR BLD AUTO: 0.8 % (ref 0–1.5)
BILIRUB SERPL-MCNC: 0.3 MG/DL (ref 0–1.2)
BUN SERPL-MCNC: 32 MG/DL (ref 8–23)
BUN/CREAT SERPL: 22.4 (ref 7–25)
CALCIUM SPEC-SCNC: 9.2 MG/DL (ref 8.6–10.5)
CHLORIDE SERPL-SCNC: 98 MMOL/L (ref 98–107)
CO2 SERPL-SCNC: 26 MMOL/L (ref 22–29)
CREAT SERPL-MCNC: 1.43 MG/DL (ref 0.76–1.27)
DEPRECATED RDW RBC AUTO: 49.3 FL (ref 37–54)
EGFRCR SERPLBLD CKD-EPI 2021: 49.8 ML/MIN/1.73
EOSINOPHIL # BLD AUTO: 0.06 10*3/MM3 (ref 0–0.4)
EOSINOPHIL NFR BLD AUTO: 1 % (ref 0.3–6.2)
ERYTHROCYTE [DISTWIDTH] IN BLOOD BY AUTOMATED COUNT: 14.7 % (ref 12.3–15.4)
GLOBULIN UR ELPH-MCNC: 2.9 GM/DL
GLUCOSE SERPL-MCNC: 271 MG/DL (ref 65–99)
HCT VFR BLD AUTO: 43.1 % (ref 37.5–51)
HGB BLD-MCNC: 13.4 G/DL (ref 13–17.7)
IMM GRANULOCYTES # BLD AUTO: 0.03 10*3/MM3 (ref 0–0.05)
IMM GRANULOCYTES NFR BLD AUTO: 0.5 % (ref 0–0.5)
INR PPP: 1.04 (ref 0.89–1.12)
LYMPHOCYTES # BLD AUTO: 0.81 10*3/MM3 (ref 0.7–3.1)
LYMPHOCYTES NFR BLD AUTO: 13.1 % (ref 19.6–45.3)
MCH RBC QN AUTO: 28.4 PG (ref 26.6–33)
MCHC RBC AUTO-ENTMCNC: 31.1 G/DL (ref 31.5–35.7)
MCV RBC AUTO: 91.3 FL (ref 79–97)
MONOCYTES # BLD AUTO: 0.52 10*3/MM3 (ref 0.1–0.9)
MONOCYTES NFR BLD AUTO: 8.4 % (ref 5–12)
NEUTROPHILS NFR BLD AUTO: 4.71 10*3/MM3 (ref 1.7–7)
NEUTROPHILS NFR BLD AUTO: 76.2 % (ref 42.7–76)
NRBC BLD AUTO-RTO: 0 /100 WBC (ref 0–0.2)
PLATELET # BLD AUTO: 246 10*3/MM3 (ref 140–450)
PMV BLD AUTO: 10.4 FL (ref 6–12)
POTASSIUM SERPL-SCNC: 4.4 MMOL/L (ref 3.5–5.2)
PROT SERPL-MCNC: 7.3 G/DL (ref 6–8.5)
PROTHROMBIN TIME: 13.7 SECONDS (ref 12.2–14.5)
QT INTERVAL: 382 MS
QTC INTERVAL: 469 MS
RBC # BLD AUTO: 4.72 10*6/MM3 (ref 4.14–5.8)
SODIUM SERPL-SCNC: 137 MMOL/L (ref 136–145)
WBC NRBC COR # BLD: 6.18 10*3/MM3 (ref 3.4–10.8)

## 2023-07-28 PROCEDURE — 73562 X-RAY EXAM OF KNEE 3: CPT

## 2023-07-28 PROCEDURE — 80053 COMPREHEN METABOLIC PANEL: CPT | Performed by: EMERGENCY MEDICINE

## 2023-07-28 PROCEDURE — 85730 THROMBOPLASTIN TIME PARTIAL: CPT | Performed by: EMERGENCY MEDICINE

## 2023-07-28 PROCEDURE — 85025 COMPLETE CBC W/AUTO DIFF WBC: CPT | Performed by: EMERGENCY MEDICINE

## 2023-07-28 PROCEDURE — 93005 ELECTROCARDIOGRAM TRACING: CPT | Performed by: EMERGENCY MEDICINE

## 2023-07-28 PROCEDURE — 85610 PROTHROMBIN TIME: CPT | Performed by: EMERGENCY MEDICINE

## 2023-07-28 PROCEDURE — 99284 EMERGENCY DEPT VISIT MOD MDM: CPT

## 2023-07-28 RX ORDER — NALOXONE HYDROCHLORIDE 4 MG/.1ML
SPRAY NASAL
Qty: 2 EACH | Refills: 0 | Status: SHIPPED | OUTPATIENT
Start: 2023-07-28

## 2023-07-28 RX ORDER — TRAMADOL HYDROCHLORIDE 50 MG/1
50 TABLET ORAL EVERY 6 HOURS PRN
Qty: 15 TABLET | Refills: 0 | Status: SHIPPED | OUTPATIENT
Start: 2023-07-28

## 2023-07-28 RX ORDER — SODIUM CHLORIDE 0.9 % (FLUSH) 0.9 %
10 SYRINGE (ML) INJECTION AS NEEDED
Status: DISCONTINUED | OUTPATIENT
Start: 2023-07-28 | End: 2023-07-28 | Stop reason: HOSPADM

## 2023-07-28 NOTE — DISCHARGE INSTRUCTIONS
Rest.  Use the brace that was provided to you by your orthopedist.  Continue your current medications.  Ultram for more severe pain.  Call your orthopedist for follow-up.  Call Dr. Sae Andrade (vascular surgery) for follow-up in office.  Return to the emergency department if you have acute worsening pain, discoloration of the limb, numbness or other acute concerns.

## 2023-07-28 NOTE — ED PROVIDER NOTES
"Subjective   History of Present Illness  Mr. Parra is a 77 yr old male who was referred to our ED by his PCP for evaluation of right knee and hip pain in the setting of possible arterial vascular occlusive disease.  The pt states he has had ongoing right knee and right hip pain for at least a month.  He states at times, the knee gives out and results in a fall.  He states that he had outpatient ankle-brachial index studies and arterial Doppler of his extremities a few days ago and was contacted by his PCP and advised to come to our ER for concerns about possible occlusive arterial disease.  The patient states he was also seen by an orthopedist earlier this week and had a steroid injection in his right knee that did not offer any relief.  He states that he was told to return to the orthopedist for \"gel injections\" if this did not help.  The patient denies any injury to the leg.  Has had no associated chest pain or shortness of breath.  Past medical history includes previous CABG x3, IDDM type II, polyneuropathy, cardiac valve replacement and previous CVA in February 2023.    Review of Systems   Constitutional:  Negative for chills and fever.   HENT:  Negative for sore throat.    Respiratory:  Negative for cough and shortness of breath.    Cardiovascular:  Negative for chest pain.   Gastrointestinal:  Negative for abdominal pain, nausea and vomiting.   Genitourinary:  Negative for dysuria.   Musculoskeletal:  Positive for arthralgias (right knee and right hip pain).   Skin:  Negative for rash and wound.   Neurological:  Negative for numbness.   Hematological:  Does not bruise/bleed easily.   Psychiatric/Behavioral: Negative.       Past Medical History:   Diagnosis Date    Back pain     Bleeding ulcer     1972    BPH (benign prostatic hypertrophy)     Cervical spondylosis without myelopathy     Coronary artery disease     Degeneration of intervertebral disc of cervical region     Depression     Diabetes mellitus     " DIAGNOSED ; CHECKS FSBG DAILY     History of transfusion      FROM [facility]    Hyperlipidemia     Hypertension     Kidney stone     MI (myocardial infarction)     Shingles     Type 2 diabetes mellitus        Allergies   Allergen Reactions    Chlorthalidone GI Intolerance     Severe hyponatremia       Past Surgical History:   Procedure Laterality Date    CARDIAC CATHETERIZATION      CARDIAC VALVE REPLACEMENT      CORONARY ARTERY BYPASS GRAFT  2004    x 3 vessels    CYSTOSCOPY W/ LASER LITHOTRIPSY      INTERVENTIONAL RADIOLOGY PROCEDURE N/A 10/20/2016    Procedure: IR myelogram lumbar spine;  Surgeon: Chris Oakes MD;  Location: [facility] CATH INVASIVE LOC[facility];  Service:     LUMBAR DISCECTOMY FUSION INSTRUMENTATION N/A 10/28/2016    Procedure: LUMBAR LAMINECTOMY POSTERIOR LUMBAR INTERBODY FUSION L4-S1;  Surgeon: Toni Ramírez MD;  Location:  [facility];  Service:        Family History   Problem Relation Age of Onset    Heart disease Mother     Heart disease Father     Diabetes Father        Social History     Socioeconomic History    Marital status:    Tobacco Use    Smoking status: Former     Types: Cigarettes     Quit date:      Years since quittin.5    Smokeless tobacco: Never   Vaping Use    Vaping Use: Never used   Substance and Sexual Activity    Alcohol use: No    Drug use: No    Sexual activity: Defer           Objective   Physical Exam  Constitutional:       General: He is not in acute distress.     Appearance: Normal appearance. He is not ill-appearing.   HENT:      Head: Normocephalic.      Nose: Nose normal.   Eyes:      Pupils: Pupils are equal, round, and reactive to light.   Cardiovascular:      Rate and Rhythm: Normal rate and regular rhythm.      Pulses: Normal pulses.      Heart sounds: Normal heart sounds.      Comments: Palpable DP pulses in the right foot.  DP and PT pulses easily dopplered at bedside.  Both legs are warm and pink with no evidence of impaired perfusion at  "this time.  Pulmonary:      Effort: Pulmonary effort is normal.      Breath sounds: Normal breath sounds.   Abdominal:      Tenderness: There is no abdominal tenderness.   Musculoskeletal:      Cervical back: Normal range of motion and neck supple.      Comments: Normal range of motion.  No swelling or redness.  Mild tenderness on palpation over the posterior aspect of the right knee.   Skin:     General: Skin is warm and dry.   Neurological:      General: No focal deficit present.      Mental Status: He is alert and oriented to person, place, and time.   Psychiatric:         Mood and Affect: Mood normal.       Procedures           ED Course      In summary, 77-year-old male with history of IDDM, previous CABG, cardiac valve replacement, polyneuropathy and prior CVA, presents with right knee and right hip pain for the past month.  The patient states that at times, his leg \"gives out\" while walking.  He saw his PCP recently who ordered an outpatient arterial Doppler study with ankle-brachial indexes.  He was advised to come to our emergency department for further evaluation due to concerns for possible occlusive arterial disease in the leg.  The patient has also seen orthopedics recently and had an injection of steroids in the right knee with no improvement.    MDM: Differential includes claudication pain, arthritic pain, neuropathic pain, lumbar radicular pain, etc.    The spouse brought a copy of the outpatient arterial Doppler study for me to review.  It is not in epic.  Per the document that she provides, there is mild depression of segmental pressures at the calf and ankle of the right lower extremity.  The right OSBALDO is 0.82.  Right TBI 0.67.  Mild depression of the amplitude of the waveforms in the right lower extremity.  Left lower extremity shows pressures within normal limits at the thigh and calf.  Left OSBALDO 0.96.  Left TBI 0.59.  Overall impression: Mild large vessel arterial occlusive disease in the right " lower extremity apparently in the runoff beyond the common femoral artery.  No significant arterial occlusive disease in the runoff of the left lower extremity.  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  (This was dictated by Dr. Chandan Wilder on 7/26/2023.    The patient has palpable and dopplerable pedal pulses in both lower extremities.  His legs are warm and pink with no tenderness.  Given his description of the pain and the location of the pain, I favor an arthritic process.  I certainly do not think he has an acute arterial occlusive event at the present time.  He is currently on aspirin.  Plain films of the right knee were obtained and shows mild arthritic changes but no evidence of tumor, fracture or other acute process.    I discussed the case with Dr. Adams who agreed this sounds more of an arthritic process and does not warrant emergent vascular intervention or consult.  The patient be discharged to use the brace that was provided to him by his orthopedist last week and to follow-up with his orthopedist.  I will also refer him to vascular surgery for follow-up.                                         Medical Decision Making  Problems Addressed:  Chronic pain of right knee: complicated acute illness or injury  Chronic right hip pain: complicated acute illness or injury  PAD (peripheral artery disease): complicated acute illness or injury    Amount and/or Complexity of Data Reviewed  Labs: ordered.  Radiology: ordered.  ECG/medicine tests: ordered.    Risk  Prescription drug management.        Final diagnoses:   Chronic pain of right knee   Chronic right hip pain   PAD (peripheral artery disease)       ED Disposition  ED Disposition       ED Disposition   Discharge    Condition   Stable    Comment   --               Miguel Clarke MD  [address] [facility] [address] [phone]      call for follow up    Sae Andrade MD  [address]  [facility] [address]  [phone]      call for follow up appointment      Call your orthopedist on Monday for follow-up.        [facility] EMERGENCY DEPARTMENT  [address] [facility] [id number] [phone]    If symptoms worsen         Medication List      No changes were made to your prescriptions during this visit.            Sim Becker, PA  07/28/23 1338       Sim Becker PA  07/28/23 9059

## 2023-09-06 ENCOUNTER — TRANSCRIBE ORDERS (OUTPATIENT)
Dept: ADMINISTRATIVE | Facility: HOSPITAL | Age: 78
End: 2023-09-06
Payer: MEDICARE

## 2023-09-06 DIAGNOSIS — I70.213 ATHEROSCLEROSIS OF NATIVE ARTERY OF BOTH LOWER EXTREMITIES WITH INTERMITTENT CLAUDICATION: ICD-10-CM

## 2023-09-06 DIAGNOSIS — I73.9 PAD (PERIPHERAL ARTERY DISEASE): Primary | ICD-10-CM

## 2023-09-06 DIAGNOSIS — I70.201 POPLITEAL ARTERY STENOSIS, RIGHT: ICD-10-CM

## 2023-09-22 ENCOUNTER — HOSPITAL ENCOUNTER (OUTPATIENT)
Dept: MRI IMAGING | Facility: HOSPITAL | Age: 78
Discharge: HOME | End: 2023-09-22
Admitting: SURGERY
Payer: MEDICARE

## 2023-09-22 DIAGNOSIS — I70.213 ATHEROSCLEROSIS OF NATIVE ARTERY OF BOTH LOWER EXTREMITIES WITH INTERMITTENT CLAUDICATION: ICD-10-CM

## 2023-09-22 DIAGNOSIS — I70.201 POPLITEAL ARTERY STENOSIS, RIGHT: ICD-10-CM

## 2023-09-22 DIAGNOSIS — I73.9 PAD (PERIPHERAL ARTERY DISEASE): ICD-10-CM

## 2023-09-22 PROCEDURE — 72148 MRI LUMBAR SPINE W/O DYE: CPT

## 2023-09-25 ENCOUNTER — TELEPHONE (OUTPATIENT)
Dept: NEUROSURGERY | Facility: CLINIC | Age: 78
End: 2023-09-25
Payer: MEDICARE

## 2023-09-25 NOTE — TELEPHONE ENCOUNTER
Provider:  Darrell  Surgery/Procedure:  LUMBAR LAMINECTOMY POSTERIOR LUMBAR INTERBODY FUSION L4-S1   Surgery/Procedure Date: 10-  Last visit:   10-5-2017  Next visit: TBD     Reason for call:  ED with Johnathan Taylor MD (07/28/2023)    MRI Lumbar Spine Without Contrast (09/22/2023 12:15)     Patients wife called and wanted to know if someone could brandon them with the results of the MRI?  She also stated that they will need to make an appointment to see Freddy or Dr. Ramírez.  Please Advise. Thank you.

## 2023-09-28 ENCOUNTER — OFFICE VISIT (OUTPATIENT)
Dept: NEUROSURGERY | Facility: CLINIC | Age: 78
End: 2023-09-28
Payer: MEDICARE

## 2023-09-28 ENCOUNTER — HOSPITAL ENCOUNTER (OUTPATIENT)
Dept: GENERAL RADIOLOGY | Facility: HOSPITAL | Age: 78
Discharge: HOME | End: 2023-09-28
Payer: MEDICARE

## 2023-09-28 VITALS
BODY MASS INDEX: 20.99 KG/M2 | HEIGHT: 70 IN | TEMPERATURE: 97.7 F | WEIGHT: 146.6 LBS | DIASTOLIC BLOOD PRESSURE: 82 MMHG | SYSTOLIC BLOOD PRESSURE: 126 MMHG

## 2023-09-28 DIAGNOSIS — Z98.1 HISTORY OF LUMBAR FUSION: ICD-10-CM

## 2023-09-28 DIAGNOSIS — Z98.1 HISTORY OF LUMBAR FUSION: Primary | ICD-10-CM

## 2023-09-28 PROCEDURE — 73521 X-RAY EXAM HIPS BI 2 VIEWS: CPT

## 2023-09-28 PROCEDURE — 72110 X-RAY EXAM L-2 SPINE 4/>VWS: CPT

## 2023-09-28 NOTE — PROGRESS NOTES
NAME: CALLUM LOTT   DOS: 2023  : 1945  PCP: Miguel Clarke MD    Chief Complaint:    Chief Complaint   Patient presents with    Low back & right leg pain       History of Present Illness:  77 y.o. male   I saw a 77-year-old gentleman in neurosurgical consultation he presents with a complex history of diabetes she has reportedly had a stroke I did a prior two-level lumbar fusion that assisted a lot with his neurogenic claudicatory symptoms and back pain.    The reason for the presentation is he feels like his right knee is giving out he denies any paralytic symptoms he has difficulty raising his right knee up he has difficulty walking    Additionally he was has had reportedly a stroke done in [place] and has had a TAVR he has been seen by neurology where he had weakness in his right quadricep and has had vascular procedures on the leg he feels like his right leg is giving out    He is here for evaluation he has been worked up for Parkinson's in the past he has had multiple falls in the last week or so    PMHX  Allergies:  Allergies   Allergen Reactions    Chlorthalidone GI Intolerance     Severe hyponatremia    Donepezil Unknown - Low Severity    Mirtazapine Unknown - Low Severity     Medications    Current Outpatient Medications:     ALPRAZolam (XANAX) 0.25 MG tablet, Take 1 tablet by mouth At Night As Needed., Disp: , Rfl:     amLODIPine (NORVASC) 5 MG tablet, Take 1 tablet by mouth Daily., Disp: , Rfl:     aspirin 81 MG EC tablet, Take 1 tablet by mouth. Takes 1 tablet every other day, Disp: , Rfl:     atorvastatin (LIPITOR) 40 MG tablet, 2 tablets Daily., Disp: , Rfl:     carvedilol (COREG) 6.25 MG tablet, Take 1 tablet by mouth., Disp: , Rfl:     Cholecalciferol 50 MCG (2000) capsule, Take by oral route., Disp: , Rfl:     Coenzyme Q10 (CoQ-10) 100 MG capsule, H2Q CoQ10 200 mg/gram oral powder  Take by oral route., Disp: , Rfl:     Dulaglutide (Trulicity) 0.75 MG/0.5ML solution  pen-injector, Inject 0.75 mg under the skin into the appropriate area as directed 1 (One) Time Per Week., Disp: 6 mL, Rfl: 2    DULoxetine (CYMBALTA) 60 MG capsule, Cymbalta 60 mg capsule,delayed release  Take 1 capsule every day by oral route., Disp: , Rfl:     glimepiride (AMARYL) 4 MG tablet, Take 1 tablet by mouth Every Morning Before Breakfast., Disp: 30 tablet, Rfl: 0    Jardiance 10 MG tablet tablet, Take 1 tablet by mouth Daily., Disp: , Rfl:     lisinopril (PRINIVIL,ZESTRIL) 10 MG tablet, , Disp: , Rfl:     metFORMIN (GLUCOPHAGE) 1000 MG tablet, Take 1 tablet by mouth 2 (Two) Times a Day With Meals., Disp: 180 tablet, Rfl: 2    Multiple Vitamin (MULTI-DAY VITAMINS PO), , Disp: , Rfl:     naloxone (NARCAN) 4 MG/0.1ML nasal spray, Call 911. Don't prime. [place] in 1 nostril for overdose. Repeat in 2-3 minutes in other nostril if no or minimal breathing/responsiveness., Disp: 2 each, Rfl: 0    nitroglycerin (NITROSTAT) 0.4 MG SL tablet, , Disp: , Rfl:     Omega-3 Fatty Acids (fish oil) 1000 MG capsule capsule, Take 1 capsule by mouth 2 (Two) Times a Day With Meals., Disp: , Rfl:     pantoprazole (Protonix) 40 MG EC tablet, Take 1 tablet by mouth 30 minutes before breakfast daily., Disp: 90 tablet, Rfl: 3    Sod Picosulfate-Mag Ox-Cit Acd 10-3.5-12 MG-GM -GM/160ML solution, Take 160 mL by mouth Take As Directed for 2 doses., Disp: 320 mL, Rfl: 0    tamsulosin (FLOMAX) 0.4 MG capsule 24 hr capsule, Take 1 capsule by mouth Every Night., Disp: , Rfl:     traMADol (ULTRAM) 50 MG tablet, Take 1 tablet by mouth Every 6 (Six) Hours As Needed for Moderate Pain., Disp: 15 tablet, Rfl: 0  Past Medical History:  Past Medical History:   Diagnosis Date    Back pain     Bleeding ulcer     1972    BPH (benign prostatic hypertrophy)     Cervical spondylosis without myelopathy     Coronary artery disease     Degeneration of intervertebral disc of cervical region     Depression     Diabetes mellitus     DIAGNOSED 1994; CHECKS [facility]  DAILY     History of transfusion      FROM [facility]    Hyperlipidemia     Hypertension     Kidney stone     MI (myocardial infarction)     Shingles     Type 2 diabetes mellitus      Past Surgical History:  Past Surgical History:   Procedure Laterality Date    CARDIAC CATHETERIZATION      CARDIAC VALVE REPLACEMENT      CORONARY ARTERY BYPASS GRAFT  2004    x 3 vessels    CYSTOSCOPY W/ LASER LITHOTRIPSY      INTERVENTIONAL RADIOLOGY PROCEDURE N/A 10/20/2016    Procedure: IR myelogram lumbar spine;  Surgeon: Chris Oakes MD;  Location: [facility] CATH INVASIVE LOCATION;  Service:     LUMBAR DISCECTOMY FUSION INSTRUMENTATION N/A 10/28/2016    Procedure: LUMBAR LAMINECTOMY POSTERIOR LUMBAR INTERBODY FUSION L4-S1;  Surgeon: Toni Ramírez MD;  Location:  [facility];  Service:      Social Hx:  Social History     Tobacco Use    Smoking status: Former     Types: Cigarettes     Quit date:      Years since quittin.7    Smokeless tobacco: Never   Vaping Use    Vaping Use: Never used   Substance Use Topics    Alcohol use: No    Drug use: No     Family Hx:  Family History   Problem Relation Age of Onset    Heart disease Mother     Heart disease Father     Diabetes Father      Review of Systems:        Review of Systems   Constitutional:  Positive for appetite change. Negative for activity change, chills, diaphoresis, fatigue, fever and unexpected weight change.   HENT:  Positive for hearing loss. Negative for congestion, dental problem, drooling, ear discharge, ear pain, facial swelling, mouth sores, nosebleeds, postnasal drip, rhinorrhea, sinus pressure, sinus pain, sneezing, sore throat, tinnitus, trouble swallowing and voice change.    Eyes:  Positive for visual disturbance. Negative for photophobia, pain, discharge, redness and itching.   Respiratory:  Negative for apnea, cough, choking, chest tightness, shortness of breath, wheezing and stridor.    Cardiovascular:  Negative for chest pain, palpitations and leg  swelling.   Gastrointestinal:  Negative for abdominal distention, abdominal pain, anal bleeding, blood in stool, constipation, diarrhea, nausea, rectal pain and vomiting.   Endocrine: Negative for cold intolerance, heat intolerance, polydipsia, polyphagia and polyuria.   Genitourinary:  Negative for decreased urine volume, difficulty urinating, dysuria, enuresis, flank pain, frequency, genital sores, hematuria, penile discharge, penile pain, penile swelling, scrotal swelling, testicular pain and urgency.   Musculoskeletal:  Positive for back pain. Negative for arthralgias, gait problem, joint swelling, myalgias, neck pain and neck stiffness.   Skin:  Negative for color change, pallor, rash and wound.   Allergic/Immunologic: Negative for environmental allergies, food allergies and immunocompromised state.   Neurological:  Positive for dizziness. Negative for tremors, seizures, syncope, facial asymmetry, speech difficulty, weakness, light-headedness, numbness and headaches.   Hematological:  Negative for adenopathy. Bruises/bleeds easily.   Psychiatric/Behavioral:  Positive for confusion and dysphoric mood. Negative for agitation, behavioral problems, decreased concentration, hallucinations, self-injury, sleep disturbance and suicidal ideas. The patient is nervous/anxious. The patient is not hyperactive.         I have reviewed this note template and all pertinent parts of the review of systems social, family history, surgical history and medication list  Physical Examination:  Vitals:    09/28/23 1430   BP: 126/82   Temp: 97.7 °F (36.5 °C)      General Appearance:   Well developed, well nourished, well groomed, alert, and cooperative.  Neurological examination:  Neurologic Exam  He is wide awake alert and follows commands    He has slowed speech he has visual fields that are full finger-nose-finger testing is intact tongue is midline face is symmetric he has reasonable strength with may be dysmetria right upper  extremity    He has terrible gait and station he cannot walk he feels like his right leg is getting give out on him he has quadricep weakness on the right he has some signs of intrinsic hip dysfunction and knee dysfunction on the right his toes are strong    He has no motor drift    He has no signs of hyperreflexia or clonus his back incisions well-healed    Review of Imaging/DATA:  MRI was lumbar spine was personally reviewed and interpreted and shows a widely patent canal there is certainly nothing on the right side of his back that would explain his weakness in his right leg I reviewed and personally compared the studies to his priors  Diagnoses/Plan:    Mr. Parra is a 77 y.o. male   1.  Complex mobility issue    2.  History of prior TIA stroke involving speech area and apparently right side of the body followed by neurology    3.  History of prior lumbar spine spinal fusion with very significant relief of his low back pain and symptoms of neurogenic claudication    4.  Right-sided arthritis of the knee joint    From my standpoint I see nothing in the lumbar spine that should explain his proximal iliopsoas weakness on the right given his complex history of recent stroke the most likely cause would be that this is multifactorial combination of hip knee arthritis advanced age mobility issues and prior strokes its leading to a global immobility and disc mobility issue    I certainly see nothing that would require operative fixation    I will check an x-ray just to ensure that there is somewhat stable x-rays given the recent falls and explained that I will call him if there is anything that we need to consider fixing    I counseled him on fall risk    Happy to have him see a neurologist    He needs to continue therapy visit with an occupational therapist would also be in order regarding his knee weakness.

## 2023-10-04 ENCOUNTER — OFFICE VISIT (OUTPATIENT)
Dept: PAIN MEDICINE | Facility: CLINIC | Age: 78
End: 2023-10-04
Payer: MEDICARE

## 2023-10-04 VITALS
BODY MASS INDEX: 20.53 KG/M2 | OXYGEN SATURATION: 98 % | HEART RATE: 93 BPM | HEIGHT: 70 IN | TEMPERATURE: 95.7 F | WEIGHT: 143.4 LBS

## 2023-10-04 DIAGNOSIS — M25.561 LATERAL KNEE PAIN, RIGHT: Primary | ICD-10-CM

## 2023-10-04 DIAGNOSIS — M70.71 ISCHIAL BURSITIS OF RIGHT SIDE: Primary | ICD-10-CM

## 2023-10-04 DIAGNOSIS — Z98.1 HISTORY OF LUMBAR FUSION: Primary | ICD-10-CM

## 2023-10-04 DIAGNOSIS — R29.898 WEAKNESS OF BOTH LOWER EXTREMITIES: ICD-10-CM

## 2023-10-04 DIAGNOSIS — M25.561 LATERAL KNEE PAIN, RIGHT: ICD-10-CM

## 2023-10-04 PROCEDURE — 1160F RVW MEDS BY RX/DR IN RCRD: CPT | Performed by: STUDENT IN AN ORGANIZED HEALTH CARE EDUCATION/TRAINING PROGRAM

## 2023-10-04 PROCEDURE — 1159F MED LIST DOCD IN RCRD: CPT | Performed by: STUDENT IN AN ORGANIZED HEALTH CARE EDUCATION/TRAINING PROGRAM

## 2023-10-04 PROCEDURE — 1125F AMNT PAIN NOTED PAIN PRSNT: CPT | Performed by: STUDENT IN AN ORGANIZED HEALTH CARE EDUCATION/TRAINING PROGRAM

## 2023-10-04 PROCEDURE — 99204 OFFICE O/P NEW MOD 45 MIN: CPT | Performed by: STUDENT IN AN ORGANIZED HEALTH CARE EDUCATION/TRAINING PROGRAM

## 2023-10-04 RX ORDER — ATORVASTATIN CALCIUM 80 MG/1
TABLET, FILM COATED ORAL
COMMUNITY
Start: 2023-10-01

## 2023-10-04 NOTE — PROGRESS NOTES
Referring Physician: Toni Ramírez MD  [address] [facility] [address]  [place] [id number]    Primary Physician: Miguel Clarke MD    CHIEF COMPLAINT or REASON FOR VISIT: No chief complaint on file.      Initial history of present illness on 10/04/2023:  Mr. Prashant Parra is 77 y.o. male who presents as a new patient referral for evaluation treatment of chronic right-sided buttock pain and right knee pain.  Mr. Parra has past medical history of a L4-S1 fusion with Dr. Ramírez a couple years ago which essentially resolved his low back and radicular type pains.  He has been doing well until approximately 1 year ago as he has had some recent issues with a TIA and mobility issues with multiple falls.  His wife accompanies him today.  They report that he had a fall in July of this year which instigated right buttock pain and right lateral knee pain.  He denies any numbness or tingling.  He has had some arterial stenosis of his right lower extremity which underwent stenting however he denied any distal foot/leg pain with ambulation.  He has tried an intra-articular knee steroid injection which was helpful for about 5 days.  Patient denies any bowel or bladder dysfunction, lower extremity weakness, new onset saddle anesthesia or unexplained weight loss.       Interval history:    Interventions:      Objective Pain Scoring:   BRIEF PAIN INVENTORY:  Total score:   Pain Score    10/04/23 0948   PainSc:   9   PainLoc: Knee  Comment: Right      PHQ-2: PHQ-2 Total Score: 2  PHQ-9: PHQ-9: Brief Depression Severity Measure Score: 19  Opioid Risk Tool:         Review of Systems:   ROS negative except as otherwise noted     Past Medical History:   Past Medical History:   Diagnosis Date    Back pain     Bleeding ulcer     1972    BPH (benign prostatic hypertrophy)     Cervical spondylosis without myelopathy     Coronary artery disease     Degeneration of intervertebral disc of cervical region     Depression      Diabetes mellitus     DIAGNOSED ; CHECKS FSBG DAILY     History of transfusion      FROM [facility]    Hyperlipidemia     Hypertension     Kidney stone     MI (myocardial infarction)     Shingles     Type 2 diabetes mellitus          Past Surgical History:   Past Surgical History:   Procedure Laterality Date    CARDIAC CATHETERIZATION      CARDIAC VALVE REPLACEMENT      CORONARY ARTERY BYPASS GRAFT  2004    x 3 vessels    CYSTOSCOPY W/ LASER LITHOTRIPSY      INTERVENTIONAL RADIOLOGY PROCEDURE N/A 10/20/2016    Procedure: IR myelogram lumbar spine;  Surgeon: Chris Oakes MD;  Location:  [facility];  Service:     LUMBAR DISCECTOMY FUSION INSTRUMENTATION N/A 10/28/2016    Procedure: LUMBAR LAMINECTOMY POSTERIOR LUMBAR INTERBODY FUSION L4-S1;  Surgeon: Toni Ramírez MD;  Location:  [facility];  Service:          Family History   Family History   Problem Relation Age of Onset    Heart disease Mother     Heart disease Father     Diabetes Father          Social History   Social History     Socioeconomic History    Marital status:    Tobacco Use    Smoking status: Former     Types: Cigarettes     Quit date:      Years since quittin.7    Smokeless tobacco: Never   Vaping Use    Vaping Use: Never used   Substance and Sexual Activity    Alcohol use: No    Drug use: No    Sexual activity: Defer        Medications:     Current Outpatient Medications:     ALPRAZolam (XANAX) 0.25 MG tablet, Take 1 tablet by mouth At Night As Needed., Disp: , Rfl:     amLODIPine (NORVASC) 5 MG tablet, Take 1 tablet by mouth Daily., Disp: , Rfl:     aspirin 81 MG EC tablet, Take 1 tablet by mouth. Takes 1 tablet every other day, Disp: , Rfl:     atorvastatin (LIPITOR) 80 MG tablet, , Disp: , Rfl:     carvedilol (COREG) 6.25 MG tablet, Take 1 tablet by mouth., Disp: , Rfl:     Cholecalciferol 50 MCG (2000) capsule, Take by oral route., Disp: , Rfl:     Coenzyme Q10 (CoQ-10) 100 MG capsule, H2Q CoQ10  "200 mg/gram oral powder  Take by oral route., Disp: , Rfl:     Dulaglutide (Trulicity) 0.75 MG/0.5ML solution pen-injector, Inject 0.75 mg under the skin into the appropriate area as directed 1 (One) Time Per Week., Disp: 6 mL, Rfl: 2    DULoxetine (CYMBALTA) 60 MG capsule, Cymbalta 60 mg capsule,delayed release  Take 1 capsule every day by oral route., Disp: , Rfl:     glimepiride (AMARYL) 4 MG tablet, Take 1 tablet by mouth Every Morning Before Breakfast., Disp: 30 tablet, Rfl: 0    Jardiance 10 MG tablet tablet, Take 1 tablet by mouth Daily., Disp: , Rfl:     metFORMIN (GLUCOPHAGE) 1000 MG tablet, Take 1 tablet by mouth 2 (Two) Times a Day With Meals., Disp: 180 tablet, Rfl: 2    Multiple Vitamin (MULTI-DAY VITAMINS PO), , Disp: , Rfl:     naloxone (NARCAN) 4 MG/0.1ML nasal spray, Call 911. Don't prime. Eva in 1 nostril for overdose. Repeat in 2-3 minutes in other nostril if no or minimal breathing/responsiveness., Disp: 2 each, Rfl: 0    nitroglycerin (NITROSTAT) 0.4 MG SL tablet, , Disp: , Rfl:     Omega-3 Fatty Acids (fish oil) 1000 MG capsule capsule, Take 1 capsule by mouth 2 (Two) Times a Day With Meals., Disp: , Rfl:     pantoprazole (Protonix) 40 MG EC tablet, Take 1 tablet by mouth 30 minutes before breakfast daily., Disp: 90 tablet, Rfl: 3    tamsulosin (FLOMAX) 0.4 MG capsule 24 hr capsule, Take 1 capsule by mouth Every Night., Disp: , Rfl:     lisinopril (PRINIVIL,ZESTRIL) 10 MG tablet, , Disp: , Rfl:     Sod Picosulfate-Mag Ox-Cit Acd 10-3.5-12 MG-GM -GM/160ML solution, Take 160 mL by mouth Take As Directed for 2 doses., Disp: 320 mL, Rfl: 0    traMADol (ULTRAM) 50 MG tablet, Take 1 tablet by mouth Every 6 (Six) Hours As Needed for Moderate Pain., Disp: 15 tablet, Rfl: 0        Physical Exam:     Vitals:    10/04/23 0948   Pulse: 93   Temp: 95.7 °F (35.4 °C)   SpO2: 98%   Weight: 65 kg (143 lb 6.4 oz)   Height: 177.8 cm (70\")   PainSc:   9   PainLoc: Knee  Comment: Right        General: Alert and " oriented, No acute distress.   HEENT: Normocephalic, atraumatic.   Cardiovascular: No gross edema  Respiratory: Respirations are non-labored    Lumbar Spine:   No masses or atrophy  Range of motion - Flexion normal. Extension normal. Right Lat Bending normal. Left Lat Bending normal  Facet Loading: Negative bilaterally  Facet Palpation - Nontender   PSIS tenderness - Negative bilaterally  Angus's/KAYLAH/Thigh thrust - Negative bilaterally  Straight leg raise: Negative bilaterally  Slump test: Negative bilaterally  TTP right ischial bursa    KNEE Exam Right Left   Inspection No erythema, swelling, or obvious bony deformity No erythema, swelling, or obvious bony deformity   Palpation Patella: Negative  Pes Anserine bursa: Negative  Medial joint line: Negative  Lateral joint line: TTP  Popliteal fossa: Negative  ITB: TTP Patella:  Pes Anserine bursa:  Medial joint line:  Lateral joint line:  Popliteal fossa:  ITB:   ROM Flexion: Full  Extension: Full Flexion:  Extension:   Special test: ACL/PCL Anterior drawer: Negative  Posterior drawer: Negative Anterior drawer:  Posterior drawer:   Special test: Meniscus Orin: Lateral joint pain Orin:   Special test: MCL/LCL Valgus laxity: None  Varus laxity: No laxity but painful Valgus laxity:  Varus laxity:   Patellar tracking: Patella grind: Negative Patella grind:         Motor Exam:    Strength: Rate on 1-5 scale Right Left    C5-Elbow flexion, Deltoid 5 5    C6-Wrist extension 5 5    C7- Elbow / finger extension 5 5    C8- Finger flexion 5 5    T1- Intrinsics hand 5 5    Strength: Rate on 1-5 scale Right Left    L1/2- hip flexion 5 5    L3- knee extension 5 5    L4- ankle dorsiflexion 5 5    L5- great toe extension 5 5    S1- ankle plantarflexion 5 5    Sensory Exam: Full and equal sensation to light touch throughout.    Neurologic: Cranial Nerves II-XII are grossly intact.   Psychiatric: Cooperative.   Gait: Antalgic  Assistive Devices: None    Imaging Studies:    Results for orders placed during the hospital encounter of 09/22/23    MRI Lumbar Spine Without Contrast    Narrative  MRI LUMBAR SPINE WO CONTRAST    Date of Exam: 9/22/2023 11:33 AM EDT    Indication: POPLITEAL ARTERY STENOSIS RIGHT, PAD, ATHEROSCLEROSIS OF NATIVE ARTERY OF BOTH LOWER EXTRE WITH INTERMITTENT CLAUDICATION.    Comparison: Lumbar spine radiographs 11/14/2016    Technique:  Routine multiplanar/multisequence sequence images of the lumbar spine were obtained without contrast administration.      Findings:  Redemonstration of posterior instrumented fusion and interbody fusion from L4-S1, with associated metal susceptibility artifact and field inhomogeneity limiting assessment of the adjacent bony and soft tissue structures in this area. The bone marrow  signal intensity is within normal limits without evidence of focal or suspicious bony lesion. There is mild inferior endplate edema at L4. No fracture. Spinal alignment is satisfactory. Vertebral body heights are normal. There are degenerative changes  with level-by-level assessment as follows:    T12-L1: Mild degenerative disc disease. Mild thickening of the ligamentum flavum. No significant spinal canal or neuroforaminal stenosis.    L1-L2: Mild degenerative disc disease with trace posterior disc bulge. No significant spinal canal or neuroforaminal stenosis.    L2-L3: Mild degenerative disc disease with trace posterior disc bulge. Mild spinal canal stenosis. Mild to moderate left and mild right neuroforaminal stenosis.    L3-L4: Mild degenerative disc disease with trace circumferential disc bulge. Mild spinal canal stenosis. Mild to moderate right neuroforaminal stenosis.    L4-L5: Interbody fusion and posterior fusion. Metal susceptibility artifact somewhat limits assessment of the spinal canal and lateral recesses/neural foramina. Hypointense signal appears to efface the left ventral epidural space and inferior aspect of  the left lateral recess  (series 5 image 26). There is apparent effacement of the left perineural fat in the left neuroforamen (series 3 image 13-14). Minimal right-sided neuroforaminal stenosis. No significant/severe spinal canal narrowing.    L5-S1: Interbody fusion and posterior fusion. No significant spinal canal stenosis. Field in homogeneity and metal susceptibility artifact limits assessment of the bilateral neural foramina at this level.    Normal morphology and signal intensity of the cauda equina and conus medullaris. The conus terminates at the inferior endplate of L1. The paravertebral soft tissues are unremarkable. No paravertebral collections. No acute or suspicious findings in the  partially imaged portions of the posterior abdomen and pelvis.    Impression  Impression:  Redemonstration of posterior instrumented fusion and interbody fusion in the lower lumbar spine.    Mild degenerative disc disease above the fusion. No significant or severe spinal canal narrowing. Multilevel mild to moderate neuroforaminal stenoses above the fusion detailed above.    Hypointense signal appears to efface the left ventral and lateral epidural space and some of the left lateral recess and left neuroforamen at L4-L5. It is difficult to determine whether this reflects artifact and field inhomogeneity from adjacent  hardware and interbody graft. Epidural scar tissue could have this appearance. Correlate for any radicular symptoms referrable to this level.          Electronically Signed: Mason Mariscal MD  9/22/2023 1:44 PM EDT  Workstation ID: [id number]      Results for orders placed during the hospital encounter of 06/28/16    MRI lumbar spine wo contrast    Narrative  EXAMINATION: MRI LUMBAR SPINE WITHOUT CONTRAST-06/28/2016:    INDICATION: Spinal stenosis; M48.06-Spinal stenosis, lumbar region,  bilateral leg weakness, left greater than right.    TECHNIQUE: Multiplanar, multi-weighted MRI of the lumbar spine without  contrast.    COMPARISON:  NONE    FINDINGS: There is mild retrolisthesis of L5 relative to S1 measuring  approximately 3.8 mm. The conus medullaris terminates posterior to the  L1 level. There is no intrathecal mass. Small annular fissure is present  posterior to the L4-L5 level. Small posterior disc bulging is present at  the L5-S1 level.    The parasagittal T1-weighted sequences reveal mild neuroforaminal  narrowing on the right at L4-L5 and L5-S1. There is moderate  neuroforaminal narrowing on the left at L5-S1.    The STIR sequences reveal mild osseous edema in the right pedicle of  T12.    AXIAL IMAGES:  L4-L5:  A small central disc protrusion is present. Mild bilateral facet  hypertrophy is present. The ligamentum flavum is thickened. There is no  significant neuroforaminal narrowing. The AP canal diameter at this  level measures approximately 9 mm.    L5-S1:  A small central disc protrusion is present. Moderate bilateral  facet hypertrophy is present. There is mild bilateral neuroforaminal  narrowing. There is no significant central canal stenosis.    Impression  Mild multilevel degenerative lumbar spondylosis.    D:  06/28/2016  E:  06/28/2016        This report was finalized on 6/29/2016 12:26 PM by Dr. Misha Carroll MD.      Impression & Plan:       10/04/2023: Prashant Parra is a 77 y.o. male with past medical history significant for L4-S1 fusion with Dr. Ramírez, TIA, DM 2, HTN, HLD who presents to the pain clinic for evaluation and treatment of chronic right buttock pain, right knee pain.  Examination consistent with right ischial bursitis, right lateral meniscus versus ITB versus LCL strain.  We discussed ischial bursa steroid injection.  I had an in-depth discussion with the patient regarding the risks of the procedure including bleeding, infection, damage to surrounding structures.  We discussed the potential adverse effects of corticosteroid injection including flushing of the face, lipodystrophy, skin discoloration, elevated  blood glucose, increased blood pressure.  Risks of frequent steroid administration include weight gain, hormonal changes, mood changes, osteoporosis.  Patient elected to proceed.  Additionally will refer to orthopedics for right knee.    1. Ischial bursitis of right side    2. Lateral knee pain, right        PLAN:  1. Medication Recommendations: Recommend Voltaren topical, NSAIDs, Tylenol.  Can trial turmeric 500 mg twice daily if NSAID contraindicated.    2. Physical Therapy: Continue HEP    3. Psychological: defer    4. Complementary and alternative (CAM) Therapies:     5. Labs/Diagnostic studies: None indicated     6. Imaging: MRI right knee.  I personally interpreted his right knee x-ray which does not demonstrate any significant joint space narrowing    7. Interventions: Schedule right ischial bursa steroid injection.    8. Referrals: Orthopedics, Dr. Ford, for lateral knee pain    9. Records: Neurosurgical notes reviewed    10. Lifestyle goals:    Follow-up 3 months      [facility]  Mike Lugo MD

## 2023-10-05 ENCOUNTER — TELEPHONE (OUTPATIENT)
Dept: NEUROSURGERY | Facility: CLINIC | Age: 78
End: 2023-10-05
Payer: MEDICARE

## 2023-10-05 NOTE — TELEPHONE ENCOUNTER
"Orders placed      ----- Message from Zoe Melissa sent at 10/3/2023  4:20 PM EDT -----  Please put physical therapy in chart for scheduling.  Per Sept 28 office note by Dr. Ramírez, \"He needs to continue therapy visit with an occupational therapist would also be in order regarding his knee weakness.\"  Thank you.    "

## 2023-10-12 ENCOUNTER — DOCUMENTATION (OUTPATIENT)
Dept: PAIN MEDICINE | Facility: CLINIC | Age: 78
End: 2023-10-12

## 2023-10-12 ENCOUNTER — TELEPHONE (OUTPATIENT)
Dept: PAIN MEDICINE | Facility: CLINIC | Age: 78
End: 2023-10-12

## 2023-10-12 ENCOUNTER — OUTSIDE FACILITY SERVICE (OUTPATIENT)
Dept: PAIN MEDICINE | Facility: CLINIC | Age: 78
End: 2023-10-12
Payer: MEDICARE

## 2023-10-12 NOTE — TELEPHONE ENCOUNTER
Hub staff attempted to follow warm transfer process and was unsuccessful    Caller: ELENO LOTT    Relationship to patient: [facility] ([facility])    Best call back number: [phone]    Patient is needing: CALLING BECAUSE SHE CANNOT REMEMBER WHERE THE PATIENT'S MRI WAS SCHEDULED. SHE NEEDS TO KNOW THE ADDRESS.

## 2023-10-12 NOTE — TELEPHONE ENCOUNTER
Called the patient and left  advising him his MRI was sent to [facility] on [facility], along with their phone numbers.

## 2023-10-12 NOTE — PROGRESS NOTES
Mike Lugo MD    PROCEDURE: Fluoroscopically-guided RIGHT ischial bursa injection     PRE-OP DIAGNOSIS: Ischial bursitis  POST-OP DIAGNOSIS: Same      ANTIPLATELET/ANTICOAGULANT: FANNY guidelines were followed.    CONSENT: Risks, benefits and options were explained to the patient, all questions were answered and written informed consent was obtained.    ANESTHESIA: Local only    PROCEDURE NOTE:  A pre-procedural time out was performed to confirm the correct patient, procedure, side, and site. The patient was placed prone with all pressure points padded. A sterile field was prepped in standard fashion using Chlorhexidine and sterile drape. Proper protective gear was worn by the physician including a mask, scrub cap, and sterile gloves. Utilizing oblique fluoroscopic imaging target ischial tuberosity was visualized. A 25 gauge 3.5 inch spinal needle was advanced using intermittent fluoroscopy into the caudal aspect of the target ischial tuberosity. Following negative aspiration, 40 mg of methylprednisolone with 3 ml of Bupivacaine 0.5% was injected. The needle was flushed and withdrawn. The patient's skin was cleaned with alcohol and the injection sites covered with bandages.    EBL: None    COMPLICATIONS: None    The patient was monitored until established discharge criteria were met. Vital signs remained stable throughout the procedure and in the recovery area. There were no immediate complications and the patient tolerated the procedure well. Sensory and motor exam was unchanged from baseline. The patient received written discharge instructions prior to discharge.    FOLLOW UP: As scheduled     ADDITIONAL NOTES:       [facility]  Mike Lugo MD     Codes:  [id number]  [id number]

## 2023-11-07 ENCOUNTER — OFFICE VISIT (OUTPATIENT)
Dept: ENDOCRINOLOGY | Facility: CLINIC | Age: 78
End: 2023-11-07
Payer: MEDICARE

## 2023-11-07 VITALS
HEIGHT: 70 IN | DIASTOLIC BLOOD PRESSURE: 70 MMHG | SYSTOLIC BLOOD PRESSURE: 140 MMHG | WEIGHT: 146 LBS | OXYGEN SATURATION: 97 % | HEART RATE: 88 BPM | BODY MASS INDEX: 20.9 KG/M2

## 2023-11-07 DIAGNOSIS — E11.42 TYPE 2 DIABETES MELLITUS WITH DIABETIC POLYNEUROPATHY, WITHOUT LONG-TERM CURRENT USE OF INSULIN: Primary | ICD-10-CM

## 2023-11-07 DIAGNOSIS — I10 ESSENTIAL HYPERTENSION: ICD-10-CM

## 2023-11-07 LAB
EXPIRATION DATE: ABNORMAL
EXPIRATION DATE: ABNORMAL
GLUCOSE BLDC GLUCOMTR-MCNC: 219 MG/DL (ref 70–130)
HBA1C MFR BLD: 6.8 % (ref 4.5–5.7)
Lab: ABNORMAL
Lab: ABNORMAL

## 2023-11-07 PROCEDURE — 1159F MED LIST DOCD IN RCRD: CPT | Performed by: PHYSICIAN ASSISTANT

## 2023-11-07 PROCEDURE — 3044F HG A1C LEVEL LT 7.0%: CPT | Performed by: PHYSICIAN ASSISTANT

## 2023-11-07 PROCEDURE — 83036 HEMOGLOBIN GLYCOSYLATED A1C: CPT | Performed by: PHYSICIAN ASSISTANT

## 2023-11-07 PROCEDURE — 1160F RVW MEDS BY RX/DR IN RCRD: CPT | Performed by: PHYSICIAN ASSISTANT

## 2023-11-07 PROCEDURE — 3077F SYST BP >= 140 MM HG: CPT | Performed by: PHYSICIAN ASSISTANT

## 2023-11-07 PROCEDURE — 3078F DIAST BP <80 MM HG: CPT | Performed by: PHYSICIAN ASSISTANT

## 2023-11-07 PROCEDURE — 99214 OFFICE O/P EST MOD 30 MIN: CPT | Performed by: PHYSICIAN ASSISTANT

## 2023-11-07 PROCEDURE — 82947 ASSAY GLUCOSE BLOOD QUANT: CPT | Performed by: PHYSICIAN ASSISTANT

## 2023-11-07 RX ORDER — VITAMIN E 268 MG
400 CAPSULE ORAL DAILY
COMMUNITY

## 2023-11-07 RX ORDER — MULTIVIT WITH MINERALS/LUTEIN
500 TABLET ORAL DAILY
COMMUNITY

## 2023-11-07 NOTE — PROGRESS NOTES
"     Office Note      Date: 2023  Patient Name: Prashant Parra  MRN: [id number]  : 1945    Chief Complaint   Patient presents with    Diabetes     Type II       History of Present Illness:   Prashant Parra is a 77 y.o. male who presents for follow up for Type 2 diabetes diagnosed in .  He remains on metformin 1000 mg twice a day, Jardiance 10 mg daily, Trulicity 0.75 mg weekly and glimepiride 4 mg daily.  He is accompanied by his wife today who reports overall his blood sugars have been pretty good.  He checks his readings daily.  She reports they recently went to the cardiologist and they were concerned about his weight loss.  Patient and his wife report patient has very little appetite.  Patient has had a lot of trouble recently with dizzy spells and also hip and knee pain.  He is currently going to physical therapy twice a week this seems to be helping some.  They are leaving for [place] later this month and will be there until May.      Subjective     Review of Systems:   Review of Systems   Constitutional:  Positive for appetite change.   Cardiovascular: Negative.    Gastrointestinal: Negative.    Endocrine: Negative.    Musculoskeletal:  Positive for arthralgias and myalgias.   Neurological:  Positive for dizziness and weakness.       The following portions of the patient's history were reviewed and updated as appropriate: allergies, current medications, past family history, past medical history, past social history, past surgical history, and problem list.    Objective     Vitals:    23 1120   BP: 140/70   BP Location: Left arm   Patient Position: Sitting   Cuff Size: Adult   Pulse: 88   SpO2: 97%   Weight: 66.2 kg (146 lb)   Height: 177.8 cm (70\")     Body mass index is 20.95 kg/m².    Physical Exam    HEMOGLOBIN A1C  Lab Results   Component Value Date    HGBA1C 6.8 (A) 2023       GLUCOSE  Glucose   Date Value Ref Range Status   2023 219 (A) 70 - 130 mg/dL Final "       CMP  Lab Results   Component Value Date    GLUCOSE 271 (H) 07/28/2023    BUN 32 (H) 07/28/2023    CREATININE 1.43 (H) 07/28/2023    EGFRIFNONA 66 10/27/2016    BCR 22.4 07/28/2023    K 4.4 07/28/2023    CO2 26.0 07/28/2023    CALCIUM 9.2 07/28/2023    AST 14 07/28/2023    ALT 7 07/28/2023         Assessment / Plan      Assessment & Plan:  1. Type 2 diabetes mellitus with diabetic polyneuropathy, without long-term current use of insulin  His hemoglobin A1c is good at 6.8%.  They deny trouble with hypoglycemia.  He has lost 8 pounds since his appointment in July.  He has very little appetite.  We will try holding the Trulicity 0.75 mg weekly to see if this helps improve his appetite and promote weight gain.  We will continue metformin 1000 mg twice a day, Jardiance 10 mg daily and glimepiride 4 mg daily.  His wife will reach out to me if he has trouble with his blood sugar readings off the Trulicity.  I gave him a lab slip to have a hemoglobin A1c checked in 3 months while they were in [place] for monitoring off the Trulicity.    - POC Glucose, Blood  - POC Glycosylated Hemoglobin (Hb A1C)  - Hemoglobin A1c; Future    2. Essential hypertension  His blood pressure is okay today.  His cardiologist has been adjusting his blood pressure medication due to dizzy spells.      Return in about 6 months (around 5/7/2024) for Recheck when they return from [place].     This note was dictated using Dragon voice recognition.    Jessica Lucio PA-C  11/07/2023

## 2023-12-27 RX ORDER — GLIMEPIRIDE 4 MG/1
4 TABLET ORAL 2 TIMES DAILY
Qty: 180 TABLET | Refills: 1 | Status: SHIPPED | OUTPATIENT
Start: 2023-12-27

## 2023-12-27 NOTE — TELEPHONE ENCOUNTER
Rx Refill Note  Requested Prescriptions     Pending Prescriptions Disp Refills    glimepiride (AMARYL) 4 MG tablet [Pharmacy Med Name: GLIMEPIRIDE TABS 4MG] 180 tablet 3     Sig: TAKE 1 TABLET TWICE A DAY    Dx Code:  E11.65  Next OV 05/20/24 with DELMI MARTE  Last office visit with prescribing clinician: 11/7/2023   Last telemedicine visit with prescribing clinician: Visit date not found   Next office visit with prescribing clinician: 5/20/2024                         Would you like a call back once the refill request has been completed: [] Yes [] No    If the office needs to give you a call back, can they leave a voicemail: [] Yes [] No    Iva Leonard MA  12/27/23, 15:08 EST

## 2024-03-13 NOTE — TELEPHONE ENCOUNTER
Rx Refill Note  Requested Prescriptions     Pending Prescriptions Disp Refills    metFORMIN (GLUCOPHAGE) 1000 MG tablet [Pharmacy Med Name: METFORMIN HCL TABS 1000MG] 180 tablet 3     Sig: TAKE 1 TABLET TWICE A DAY WITH MEALS          Last office visit with prescribing clinician: 11/7/2023     Next office visit with prescribing clinician: 5/20/2024         Lyudmila Carey MA  03/13/24, 10:54 EDT

## 2024-05-20 ENCOUNTER — OFFICE VISIT (OUTPATIENT)
Dept: ENDOCRINOLOGY | Facility: CLINIC | Age: 79
End: 2024-05-20
Payer: MEDICARE

## 2024-05-20 VITALS
HEIGHT: 70 IN | OXYGEN SATURATION: 97 % | DIASTOLIC BLOOD PRESSURE: 72 MMHG | HEART RATE: 83 BPM | SYSTOLIC BLOOD PRESSURE: 122 MMHG | WEIGHT: 146 LBS | BODY MASS INDEX: 20.9 KG/M2

## 2024-05-20 DIAGNOSIS — E11.42 TYPE 2 DIABETES MELLITUS WITH DIABETIC POLYNEUROPATHY, WITHOUT LONG-TERM CURRENT USE OF INSULIN: Primary | ICD-10-CM

## 2024-05-20 LAB
EXPIRATION DATE: ABNORMAL
EXPIRATION DATE: ABNORMAL
GLUCOSE BLDC GLUCOMTR-MCNC: 288 MG/DL (ref 70–130)
HBA1C MFR BLD: 6.5 % (ref 4.5–5.7)
Lab: ABNORMAL
Lab: ABNORMAL

## 2024-05-20 PROCEDURE — 3074F SYST BP LT 130 MM HG: CPT | Performed by: PHYSICIAN ASSISTANT

## 2024-05-20 PROCEDURE — 3044F HG A1C LEVEL LT 7.0%: CPT | Performed by: PHYSICIAN ASSISTANT

## 2024-05-20 PROCEDURE — 3078F DIAST BP <80 MM HG: CPT | Performed by: PHYSICIAN ASSISTANT

## 2024-05-20 PROCEDURE — 1160F RVW MEDS BY RX/DR IN RCRD: CPT | Performed by: PHYSICIAN ASSISTANT

## 2024-05-20 PROCEDURE — 99213 OFFICE O/P EST LOW 20 MIN: CPT | Performed by: PHYSICIAN ASSISTANT

## 2024-05-20 PROCEDURE — 1159F MED LIST DOCD IN RCRD: CPT | Performed by: PHYSICIAN ASSISTANT

## 2024-05-20 PROCEDURE — 83036 HEMOGLOBIN GLYCOSYLATED A1C: CPT | Performed by: PHYSICIAN ASSISTANT

## 2024-05-20 PROCEDURE — 82947 ASSAY GLUCOSE BLOOD QUANT: CPT | Performed by: PHYSICIAN ASSISTANT

## 2024-05-20 NOTE — PROGRESS NOTES
"     Office Note      Date: 2024  Patient Name: Prashant Parra  MRN: [id number]  : 1945    Chief Complaint   Patient presents with    Diabetes       History of Present Illness:   Prashant Parra is a 78 y.o. male who presents for follow-up for type 2 diabetes diagnosed in .  They return from [place] late April.  He has seen his neurologist and podiatrist and has an appointment coming up next week with his cardiologist.  His wife reports overall things seem to be going well.  He has not gained any weight but his appetite does seem some better off the Trulicity.  He remains on Jardiance 10 mg daily, metformin 1000 mg twice a day and glimepiride 4 mg daily.  His wife reports she did give him an additional glimepiride for a few days while they were in [place] if his glucose was staying above 200.  Recently his blood sugars are typically less than 200 he has had 1 low blood glucose of 65 mg/dL but denies severe or frequent hypoglycemia.  He is out of the wheelchair now and continues therapy exercises at home.  He continues to have dizzy spells and neurology is doing some testing to evaluate this.  Overall his blood pressure readings have been good.  He saw his foot doctor recently and all was okay.        Subjective     Review of Systems:   Review of Systems   Cardiovascular: Negative.    Gastrointestinal: Negative.    Endocrine: Negative.    Musculoskeletal:  Positive for arthralgias.   Neurological:  Positive for dizziness and weakness.       The following portions of the patient's history were reviewed and updated as appropriate: allergies, current medications, past family history, past medical history, past social history, past surgical history, and problem list.    Objective     Vitals:    24 1047   BP: 122/72   BP Location: Right arm   Patient Position: Sitting   Cuff Size: Adult   Pulse: 83   SpO2: 97%   Weight: 66.2 kg (146 lb)   Height: 177.8 cm (70\")     Body mass index is 20.95 " kg/m².    Physical Exam  Vitals reviewed.   Constitutional:       General: He is not in acute distress.     Appearance: Normal appearance.   Neurological:      Mental Status: He is alert.         HEMOGLOBIN A1C  Lab Results   Component Value Date    HGBA1C 6.5 (A) 05/20/2024       GLUCOSE  Glucose   Date Value Ref Range Status   05/20/2024 288 (A) 70 - 130 mg/dL Final       CMP  Lab Results   Component Value Date    GLUCOSE 271 (H) 07/28/2023    BUN 32 (H) 07/28/2023    CREATININE 1.43 (H) 07/28/2023    EGFRIFNONA 66 10/27/2016    BCR 22.4 07/28/2023    K 4.4 07/28/2023    CO2 26.0 07/28/2023    CALCIUM 9.2 07/28/2023    AST 14 07/28/2023    ALT 7 07/28/2023           Assessment / Plan      Assessment & Plan:  1. Type 2 diabetes mellitus with diabetic polyneuropathy, without long-term current use of insulin  His hemoglobin A1c today is 6.5%.  His blood glucose was 288 mg/dL this morning.  This is high but he had cereal for breakfast.  For now we will continue metformin 1000 mg twice a day, Jardiance 10 mg daily and glimepiride 4 mg daily.  His wife will reach out if his blood glucose readings start to creep up above 200 more regularly.  His weight is stable.  I encouraged continued healthy regular eating habits.  I refilled his metformin today.  - POC Glucose, Blood  - POC Glycosylated Hemoglobin (Hb A1C)      Return in about 5 months (around 10/20/2024) for 5-6 mos.     This note was dictated using Dragon voice recognition.    Electronically signed by: ROSANNA Baird  05/20/2024